# Patient Record
Sex: MALE | Race: WHITE | NOT HISPANIC OR LATINO | Employment: OTHER | URBAN - NONMETROPOLITAN AREA
[De-identification: names, ages, dates, MRNs, and addresses within clinical notes are randomized per-mention and may not be internally consistent; named-entity substitution may affect disease eponyms.]

---

## 2023-03-31 DIAGNOSIS — I10 ESSENTIAL (PRIMARY) HYPERTENSION: ICD-10-CM

## 2023-04-01 RX ORDER — METOPROLOL TARTRATE 50 MG/1
TABLET ORAL
Qty: 180 TABLET | Refills: 3 | Status: SHIPPED | OUTPATIENT
Start: 2023-04-01 | End: 2024-03-18

## 2023-04-24 DIAGNOSIS — F41.8 OTHER SPECIFIED ANXIETY DISORDERS: ICD-10-CM

## 2023-04-25 RX ORDER — QUETIAPINE FUMARATE 25 MG/1
TABLET, FILM COATED ORAL
Qty: 180 TABLET | Refills: 3 | Status: SHIPPED | OUTPATIENT
Start: 2023-04-25 | End: 2024-02-16

## 2023-05-03 ENCOUNTER — OFFICE VISIT (OUTPATIENT)
Dept: PRIMARY CARE | Facility: CLINIC | Age: 68
End: 2023-05-03
Payer: MEDICARE

## 2023-05-03 VITALS
SYSTOLIC BLOOD PRESSURE: 122 MMHG | TEMPERATURE: 98.3 F | WEIGHT: 233 LBS | OXYGEN SATURATION: 98 % | HEART RATE: 85 BPM | HEIGHT: 71 IN | BODY MASS INDEX: 32.62 KG/M2 | DIASTOLIC BLOOD PRESSURE: 66 MMHG

## 2023-05-03 DIAGNOSIS — J44.9 CHRONIC OBSTRUCTIVE PULMONARY DISEASE, UNSPECIFIED COPD TYPE (MULTI): ICD-10-CM

## 2023-05-03 DIAGNOSIS — S76.211A INGUINAL STRAIN, RIGHT, INITIAL ENCOUNTER: Primary | ICD-10-CM

## 2023-05-03 DIAGNOSIS — Z13.6 SCREENING FOR AAA (ABDOMINAL AORTIC ANEURYSM): ICD-10-CM

## 2023-05-03 DIAGNOSIS — F32.1 MODERATE MAJOR DEPRESSION (MULTI): ICD-10-CM

## 2023-05-03 DIAGNOSIS — I48.91 ATRIAL FIBRILLATION, UNSPECIFIED TYPE (MULTI): ICD-10-CM

## 2023-05-03 DIAGNOSIS — E04.2 MULTIPLE THYROID NODULES: ICD-10-CM

## 2023-05-03 PROBLEM — F41.9 ANXIETY: Status: ACTIVE | Noted: 2023-05-03

## 2023-05-03 PROBLEM — K86.1 CHRONIC PANCREATITIS (MULTI): Status: RESOLVED | Noted: 2023-05-03 | Resolved: 2023-05-03

## 2023-05-03 PROBLEM — K29.70 GASTRITIS: Status: RESOLVED | Noted: 2023-05-03 | Resolved: 2023-05-03

## 2023-05-03 PROBLEM — I10 ESSENTIAL HYPERTENSION: Status: ACTIVE | Noted: 2023-05-03

## 2023-05-03 PROBLEM — K29.70 HELICOBACTER PYLORI GASTRITIS: Status: RESOLVED | Noted: 2023-05-03 | Resolved: 2023-05-03

## 2023-05-03 PROBLEM — Z86.69 HISTORY OF CATARACT: Status: RESOLVED | Noted: 2023-05-03 | Resolved: 2023-05-03

## 2023-05-03 PROBLEM — G56.00 CARPAL TUNNEL SYNDROME: Status: RESOLVED | Noted: 2023-05-03 | Resolved: 2023-05-03

## 2023-05-03 PROBLEM — R91.8 MULTIPLE LUNG NODULES: Status: ACTIVE | Noted: 2023-05-03

## 2023-05-03 PROBLEM — C44.92 SQUAMOUS CELL CARCINOMA OF SKIN: Status: ACTIVE | Noted: 2023-05-03

## 2023-05-03 PROBLEM — G47.33 OBSTRUCTIVE SLEEP APNEA: Status: ACTIVE | Noted: 2023-05-03

## 2023-05-03 PROBLEM — F41.8 DEPRESSION WITH ANXIETY: Status: ACTIVE | Noted: 2023-05-03

## 2023-05-03 PROBLEM — H26.9 CATARACT: Status: RESOLVED | Noted: 2023-05-03 | Resolved: 2023-05-03

## 2023-05-03 PROBLEM — B96.81 HELICOBACTER PYLORI GASTRITIS: Status: RESOLVED | Noted: 2023-05-03 | Resolved: 2023-05-03

## 2023-05-03 PROBLEM — F17.200 NICOTINE DEPENDENCE: Status: ACTIVE | Noted: 2023-05-03

## 2023-05-03 PROBLEM — C7A.020: Status: RESOLVED | Noted: 2023-05-03 | Resolved: 2023-05-03

## 2023-05-03 PROBLEM — K21.9 GERD (GASTROESOPHAGEAL REFLUX DISEASE): Status: ACTIVE | Noted: 2023-05-03

## 2023-05-03 PROBLEM — M54.12 CERVICAL RADICULOPATHY: Status: ACTIVE | Noted: 2023-05-03

## 2023-05-03 PROBLEM — E78.5 HYPERLIPIDEMIA: Status: ACTIVE | Noted: 2023-05-03

## 2023-05-03 PROBLEM — M50.10 HERNIATION OF CERVICAL INTERVERTEBRAL DISC WITH RADICULOPATHY: Status: RESOLVED | Noted: 2023-05-03 | Resolved: 2023-05-03

## 2023-05-03 PROBLEM — G25.0 ESSENTIAL TREMOR: Status: ACTIVE | Noted: 2023-05-03

## 2023-05-03 PROCEDURE — 3008F BODY MASS INDEX DOCD: CPT | Performed by: FAMILY MEDICINE

## 2023-05-03 PROCEDURE — 96372 THER/PROPH/DIAG INJ SC/IM: CPT | Performed by: FAMILY MEDICINE

## 2023-05-03 PROCEDURE — 3078F DIAST BP <80 MM HG: CPT | Performed by: FAMILY MEDICINE

## 2023-05-03 PROCEDURE — 99213 OFFICE O/P EST LOW 20 MIN: CPT | Performed by: FAMILY MEDICINE

## 2023-05-03 PROCEDURE — 1159F MED LIST DOCD IN RCRD: CPT | Performed by: FAMILY MEDICINE

## 2023-05-03 PROCEDURE — 1160F RVW MEDS BY RX/DR IN RCRD: CPT | Performed by: FAMILY MEDICINE

## 2023-05-03 PROCEDURE — 3074F SYST BP LT 130 MM HG: CPT | Performed by: FAMILY MEDICINE

## 2023-05-03 RX ORDER — DULOXETIN HYDROCHLORIDE 60 MG/1
CAPSULE, DELAYED RELEASE ORAL
COMMUNITY
End: 2023-05-09 | Stop reason: SDUPTHER

## 2023-05-03 RX ORDER — KETOROLAC TROMETHAMINE 30 MG/ML
30 INJECTION, SOLUTION INTRAMUSCULAR; INTRAVENOUS ONCE
Status: COMPLETED | OUTPATIENT
Start: 2023-05-03 | End: 2023-05-03

## 2023-05-03 RX ORDER — CYANOCOBALAMIN 1000 UG/ML
1000 INJECTION, SOLUTION INTRAMUSCULAR; SUBCUTANEOUS
Status: ON HOLD | COMMUNITY
End: 2023-12-12 | Stop reason: WASHOUT

## 2023-05-03 RX ORDER — CICLOPIROX 80 MG/ML
SOLUTION TOPICAL
Status: ON HOLD | COMMUNITY
End: 2023-12-12

## 2023-05-03 RX ORDER — PANTOPRAZOLE SODIUM 40 MG/1
1 TABLET, DELAYED RELEASE ORAL DAILY
COMMUNITY
Start: 2022-10-30 | End: 2023-10-23

## 2023-05-03 RX ORDER — BUPROPION HYDROCHLORIDE 75 MG/1
75 TABLET ORAL NIGHTLY
COMMUNITY
End: 2023-07-20

## 2023-05-03 RX ORDER — DULOXETIN HYDROCHLORIDE 30 MG/1
CAPSULE, DELAYED RELEASE ORAL
COMMUNITY
End: 2023-05-09 | Stop reason: SDUPTHER

## 2023-05-03 RX ORDER — ALBUTEROL SULFATE 0.83 MG/ML
SOLUTION RESPIRATORY (INHALATION)
COMMUNITY
Start: 2017-01-13

## 2023-05-03 RX ORDER — AMLODIPINE BESYLATE 10 MG/1
10 TABLET ORAL DAILY
COMMUNITY
End: 2023-05-22

## 2023-05-03 RX ORDER — NEEDLES, FILTER 19GX1 1/2"
NEEDLE, DISPOSABLE MISCELLANEOUS
COMMUNITY
Start: 2023-02-01

## 2023-05-03 RX ORDER — SIMVASTATIN 20 MG/1
20 TABLET, FILM COATED ORAL EVERY EVENING
COMMUNITY
End: 2023-05-12

## 2023-05-03 RX ORDER — PREGABALIN 200 MG/1
200 CAPSULE ORAL 2 TIMES DAILY
COMMUNITY
End: 2023-05-09 | Stop reason: SDUPTHER

## 2023-05-03 RX ORDER — ALBUTEROL SULFATE 90 UG/1
2 AEROSOL, METERED RESPIRATORY (INHALATION) EVERY 6 HOURS PRN
Status: ON HOLD | COMMUNITY
Start: 2022-05-08 | End: 2023-12-12

## 2023-05-03 RX ORDER — CHOLECALCIFEROL (VITAMIN D3) 25 MCG
1 TABLET ORAL DAILY
Status: ON HOLD | COMMUNITY
End: 2023-12-12 | Stop reason: WASHOUT

## 2023-05-03 RX ORDER — FLUTICASONE FUROATE, UMECLIDINIUM BROMIDE AND VILANTEROL TRIFENATATE 200; 62.5; 25 UG/1; UG/1; UG/1
1 POWDER RESPIRATORY (INHALATION) DAILY
COMMUNITY
End: 2024-01-02

## 2023-05-03 RX ADMIN — KETOROLAC TROMETHAMINE 30 MG: 30 INJECTION, SOLUTION INTRAMUSCULAR; INTRAVENOUS at 11:19

## 2023-05-03 ASSESSMENT — ENCOUNTER SYMPTOMS
OCCASIONAL FEELINGS OF UNSTEADINESS: 0
DEPRESSION: 0
LOSS OF SENSATION IN FEET: 0

## 2023-05-03 ASSESSMENT — PATIENT HEALTH QUESTIONNAIRE - PHQ9
SUM OF ALL RESPONSES TO PHQ9 QUESTIONS 1 AND 2: 0
1. LITTLE INTEREST OR PLEASURE IN DOING THINGS: NOT AT ALL
2. FEELING DOWN, DEPRESSED OR HOPELESS: NOT AT ALL

## 2023-05-03 NOTE — PATIENT INSTRUCTIONS
Please follow up in 3 months  Please take your medications as prescribed  Please get your ultrasound thyroid and AAA done

## 2023-05-03 NOTE — PROGRESS NOTES
"Subjective   Patient ID: Juanjo Alexis is a 68 y.o. male who presents for Follow-up (Pulled groin/Skin peeling on face for a while).  HPI  Here for follow up  Pulled his groin on the right side when he was getting out of the car a few days ago, improving. No dysuria, hematuria, no hernia present.   Has been having dry skin on his face, admits he does not put any lotion, discussed using Aveeno or eucerin to help  Depression under control with meds  Has history of A-fib, under control with metoprolol  Still has not gotten his ultrasound for evaluation of thyroid nodules    Review of Systems  General: no fever  Eyes: no blurry vision  ENT: no sore throat, no ear pain  Resp: no cough, sob or wheezing  Cardio: no chest pain, no palpitations  Abd: no nausea/vomiting  : no dysuria, no increased urinary frequency      /66   Pulse 85   Temp 36.8 °C (98.3 °F)   Ht 1.803 m (5' 11\")   Wt 106 kg (233 lb)   SpO2 98%   BMI 32.50 kg/m²       Objective   Physical Exam  Gen: NAD, alert  Head: normocephalic/atraumatic  Eyes: conjunctivae normal  Ears: canals clear bilaterally, TM normal   Skin: dry skin present  Nose: external nose normal   Oropharynx: Deferred due to covid precautions, wearing mask  Resp: Clear to auscultation  CVS: Regular rate and rhythm  Abdomen: soft, NT, ND  Ext: no edema, NT of lower extremities       Assessment/Plan   Problem List Items Addressed This Visit       Atrial fibrillation (CMS/HCC)  controlled    Moderate major depression (CMS/HCC)  Depression under control with med     Other Visit Diagnoses       Inguinal strain, right, initial encounter    -  Primary    Relevant Medications    ketorolac (Toradol) injection 30 mg (Completed)    Chronic obstructive pulmonary disease    Under control     BMI 32.0-32.9,adult        Screening for AAA (abdominal aortic aneurysm)        Relevant Orders    Vascular US abdominal aorta anuerysm AAA screening    Multiple thyroid nodules        Relevant Orders "    US thyroid

## 2023-05-09 ENCOUNTER — OFFICE VISIT (OUTPATIENT)
Dept: PRIMARY CARE | Facility: CLINIC | Age: 68
End: 2023-05-09
Payer: MEDICARE

## 2023-05-09 VITALS
OXYGEN SATURATION: 95 % | DIASTOLIC BLOOD PRESSURE: 64 MMHG | TEMPERATURE: 98.6 F | HEIGHT: 71 IN | HEART RATE: 72 BPM | WEIGHT: 231.2 LBS | SYSTOLIC BLOOD PRESSURE: 136 MMHG | BODY MASS INDEX: 32.37 KG/M2

## 2023-05-09 DIAGNOSIS — L02.214 ABSCESS OF GROIN, RIGHT: Primary | ICD-10-CM

## 2023-05-09 DIAGNOSIS — F32.1 MODERATE MAJOR DEPRESSION (MULTI): ICD-10-CM

## 2023-05-09 DIAGNOSIS — M54.12 CERVICAL RADICULOPATHY: Primary | ICD-10-CM

## 2023-05-09 PROCEDURE — 3078F DIAST BP <80 MM HG: CPT | Performed by: PHYSICIAN ASSISTANT

## 2023-05-09 PROCEDURE — 1160F RVW MEDS BY RX/DR IN RCRD: CPT | Performed by: PHYSICIAN ASSISTANT

## 2023-05-09 PROCEDURE — 1159F MED LIST DOCD IN RCRD: CPT | Performed by: PHYSICIAN ASSISTANT

## 2023-05-09 PROCEDURE — 3008F BODY MASS INDEX DOCD: CPT | Performed by: PHYSICIAN ASSISTANT

## 2023-05-09 PROCEDURE — 3075F SYST BP GE 130 - 139MM HG: CPT | Performed by: PHYSICIAN ASSISTANT

## 2023-05-09 PROCEDURE — 99214 OFFICE O/P EST MOD 30 MIN: CPT | Performed by: PHYSICIAN ASSISTANT

## 2023-05-09 RX ORDER — PREGABALIN 200 MG/1
200 CAPSULE ORAL 2 TIMES DAILY
Qty: 60 CAPSULE | Refills: 3 | Status: SHIPPED | OUTPATIENT
Start: 2023-05-15 | End: 2023-11-30

## 2023-05-09 RX ORDER — DOXYCYCLINE 100 MG/1
100 CAPSULE ORAL 2 TIMES DAILY
Qty: 20 CAPSULE | Refills: 0 | Status: SHIPPED | OUTPATIENT
Start: 2023-05-09 | End: 2023-05-19

## 2023-05-09 RX ORDER — CHLORHEXIDINE GLUCONATE 40 MG/ML
SOLUTION TOPICAL DAILY PRN
Qty: 236 ML | Refills: 2 | Status: SHIPPED | OUTPATIENT
Start: 2023-05-09 | End: 2023-05-16

## 2023-05-09 RX ORDER — MUPIROCIN CALCIUM 20 MG/G
CREAM TOPICAL 3 TIMES DAILY
Qty: 30 G | Refills: 0 | Status: SHIPPED | OUTPATIENT
Start: 2023-05-09 | End: 2023-05-19

## 2023-05-09 RX ORDER — DULOXETIN HYDROCHLORIDE 30 MG/1
CAPSULE, DELAYED RELEASE ORAL
Qty: 90 CAPSULE | Refills: 0 | Status: SHIPPED | OUTPATIENT
Start: 2023-05-09 | End: 2023-10-19

## 2023-05-09 RX ORDER — DULOXETIN HYDROCHLORIDE 60 MG/1
CAPSULE, DELAYED RELEASE ORAL
Qty: 90 CAPSULE | Refills: 0 | Status: SHIPPED | OUTPATIENT
Start: 2023-05-09 | End: 2023-07-20

## 2023-05-09 ASSESSMENT — PATIENT HEALTH QUESTIONNAIRE - PHQ9
1. LITTLE INTEREST OR PLEASURE IN DOING THINGS: NOT AT ALL
SUM OF ALL RESPONSES TO PHQ9 QUESTIONS 1 AND 2: 0
2. FEELING DOWN, DEPRESSED OR HOPELESS: NOT AT ALL

## 2023-05-09 NOTE — PROGRESS NOTES
"Subjective   Patient ID: Juanjo Alexis is a 68 y.o. male who presents for Cyst (In groin area. Pain. Infected. Noticed it Monday morning. Pulled a groin muscle awhile back. Had a one awhile back on his buttocks. ).    HPI Juanjo Alexis is a 68 y.o. year old male patient with presenting to clinic with concern for   Chief Complaint   Patient presents with    Cyst     In groin area. Pain. Infected. Noticed it Monday morning. Pulled a groin muscle awhile back. Had a one awhile back on his buttocks.        1 week lump R groin- had an abscess starting- wife squeezed puss out of the lesion. Increasing.  Has been wheezy at home. Still using trelegy.    Patient Active Problem List   Diagnosis    Chronic obstructive pulmonary disease (CMS/HCC)    Anxiety    Atrial fibrillation (CMS/HCC)    Cervical radiculopathy    Squamous cell carcinoma of skin    Depression with anxiety    GERD (gastroesophageal reflux disease)    Essential tremor    Essential hypertension    Hyperlipidemia    Moderate major depression (CMS/HCC)    Multiple lung nodules    Nicotine dependence    Nontoxic multinodular goiter    Obstructive sleep apnea       Current Outpatient Medications:     albuterol 2.5 mg /3 mL (0.083 %) nebulizer solution, USE 1 UNIT DOSE EVERY 4-6 HOURS AS NEEDED FOR WHEEZING ., Disp: , Rfl:     albuterol 90 mcg/actuation inhaler, Inhale 2 puffs every 6 hours if needed for shortness of breath., Disp: , Rfl:     amLODIPine (Norvasc) 10 mg tablet, Take 1 tablet (10 mg) by mouth once daily., Disp: , Rfl:     BD Integra Syringe 3 mL 25 gauge x 5/8\" syringe, INJECT VITAMIN B12 ONCE MONTHLY, Disp: , Rfl:     buPROPion (Wellbutrin) 75 mg tablet, Take 1 tablet (75 mg) by mouth once daily at bedtime., Disp: , Rfl:     chlorhexidine (Hibiclens) 4 % external liquid, Apply topically once daily as needed for wound care for up to 7 days., Disp: 236 mL, Rfl: 2    cholecalciferol (Vitamin D-3) 25 MCG (1000 UT) tablet, Take 1 tablet (25 mcg) by " mouth once daily., Disp: , Rfl:     ciclopirox (Penlac) 8 % solution, APPLY TO AFFECTED NAIL BED AND SKIN DAILY, REMOVE WITH ALCOHOL EVERY 7 DAYS CONTINUE AS DIRECTED, Disp: , Rfl:     cyanocobalamin (Vitamin B-12) 1,000 mcg/mL injection, Inject 1 mL (1,000 mcg) into the shoulder, thigh, or buttocks every 30 (thirty) days., Disp: , Rfl:     diclofenac sodium 1 % kit, APPLY SPARINGLY TO THE AFFECTED AREA TWO TO THREE TIMES A DAY AS NEEDED FOR PAIN, Disp: , Rfl:     doxycycline (Vibramycin) 100 mg capsule, Take 1 capsule (100 mg) by mouth 2 times a day for 10 days. Take with at least 8 ounces (large glass) of water, do not lie down for 30 minutes after. Take with food., Disp: 20 capsule, Rfl: 0    DULoxetine (Cymbalta) 30 mg DR capsule, TAKE 1 CAPSULE BY MOUTH ONCE DAILY, TAKE WITH THE 60MG TO EQUAL 90MG, Disp: 90 capsule, Rfl: 0    DULoxetine (Cymbalta) 60 mg DR capsule, TAKE 1 CAPSULE DAILY ALONG WITH THE 30MG, TO EQUAL 90MG, Disp: 90 capsule, Rfl: 0    metoprolol tartrate (Lopressor) 50 mg tablet, TAKE 1 TABLET BY MOUTH TWICE A DAY, Disp: 180 tablet, Rfl: 3    mupirocin (Bactroban) 2 % cream, Apply topically 3 times a day for 10 days. apply to affected area, Disp: 30 g, Rfl: 0    pantoprazole (ProtoNix) 40 mg EC tablet, Take 1 tablet (40 mg) by mouth once daily., Disp: , Rfl:     [START ON 5/15/2023] pregabalin (Lyrica) 200 mg capsule, Take 1 capsule (200 mg) by mouth 2 times a day. Do not start before May 15, 2023., Disp: 60 capsule, Rfl: 3    QUEtiapine (SEROquel) 25 mg tablet, TAKE 1 TABLET BY MOUTH TWICE A DAY, Disp: 180 tablet, Rfl: 3    simvastatin (Zocor) 20 mg tablet, Take 1 tablet (20 mg) by mouth once daily in the evening., Disp: , Rfl:     Trelegy Ellipta 200-62.5-25 mcg blister with device, Inhale 1 puff once daily., Disp: , Rfl:          Review of Systems  Review of Systems:   Constitutional: Denies fever  HEENT: Denies ST, earache  CVS: Denies Chest pain  Pulmonary: Denies wheezing, SOB  GI: Denies  "N/V  : Denies dysuria  Musculoskeletal:  Denies myalgia  Neuro: Denies focal weakness or numbness.  Skin: Denies Rashes.  *Review of Systems is negative unless otherwise mentioned in HPI or ROS above.    Objective   /64   Pulse 72   Temp 37 °C (98.6 °F)   Ht 1.803 m (5' 11\")   Wt 105 kg (231 lb 3.2 oz)   SpO2 95%   BMI 32.25 kg/m²     Physical Exam  Physical exam:  /64   Pulse 72   Temp 37 °C (98.6 °F)   Ht 1.803 m (5' 11\")   Wt 105 kg (231 lb 3.2 oz)   SpO2 95%   BMI 32.25 kg/m²  reviewed   Body mass index is 32.25 kg/m².     Constitutional: NAD.  Resting comfortably.  Head: Atraumatic, normocephalic.  EENT: deferred d/t masking  Cardiac: Regular rate & rhythm.   Pulmonary: Lungs clear bilat  GI: Soft, Nontender, nondistended.   Musculoskeletal: No peripheral edema.   Skin: No evidence of trauma. Erythema surrounding excoriated lesion R inguinal fold anteriorly. Induration locally approx 2cm round and 1cm deep into the soft tissue without fluctuance. Scant amount of yellow purulent material able to be expressed. No extension of induration or erythema to scrotum. No gangrene or subcutaneous emphysema.   Psych: Intact judgement and insight.      Assessment/Plan   Problem List Items Addressed This Visit       Moderate major depression (CMS/HCC)    Relevant Medications    DULoxetine (Cymbalta) 30 mg DR capsule    DULoxetine (Cymbalta) 60 mg DR capsule     Other Visit Diagnoses       Abscess of groin, right    -  Primary    Relevant Medications    doxycycline (Vibramycin) 100 mg capsule    mupirocin (Bactroban) 2 % cream    chlorhexidine (Hibiclens) 4 % external liquid               "

## 2023-05-12 DIAGNOSIS — E78.5 HYPERLIPIDEMIA, UNSPECIFIED: ICD-10-CM

## 2023-05-12 RX ORDER — SIMVASTATIN 20 MG/1
TABLET, FILM COATED ORAL
Qty: 90 TABLET | Refills: 3 | Status: SHIPPED | OUTPATIENT
Start: 2023-05-12 | End: 2024-02-16

## 2023-05-21 DIAGNOSIS — M54.12 RADICULOPATHY, CERVICAL REGION: ICD-10-CM

## 2023-05-21 DIAGNOSIS — S73.119A: ICD-10-CM

## 2023-05-21 DIAGNOSIS — I10 ESSENTIAL (PRIMARY) HYPERTENSION: ICD-10-CM

## 2023-05-22 RX ORDER — CYCLOBENZAPRINE HCL 10 MG
TABLET ORAL
Qty: 30 TABLET | Refills: 11 | Status: SHIPPED | OUTPATIENT
Start: 2023-05-22 | End: 2023-10-05 | Stop reason: SDUPTHER

## 2023-05-22 RX ORDER — DICLOFENAC SODIUM 10 MG/G
GEL TOPICAL
Qty: 100 G | Refills: 5 | Status: SHIPPED | OUTPATIENT
Start: 2023-05-22 | End: 2023-11-01 | Stop reason: WASHOUT

## 2023-05-22 RX ORDER — AMLODIPINE BESYLATE 10 MG/1
TABLET ORAL
Qty: 90 TABLET | Refills: 3 | Status: SHIPPED | OUTPATIENT
Start: 2023-05-22 | End: 2024-04-17

## 2023-05-30 ENCOUNTER — PROCEDURE VISIT (OUTPATIENT)
Dept: PRIMARY CARE | Facility: CLINIC | Age: 68
End: 2023-05-30
Payer: MEDICARE

## 2023-05-30 VITALS
HEART RATE: 69 BPM | OXYGEN SATURATION: 96 % | WEIGHT: 229 LBS | TEMPERATURE: 98 F | BODY MASS INDEX: 32.06 KG/M2 | SYSTOLIC BLOOD PRESSURE: 126 MMHG | HEIGHT: 71 IN | DIASTOLIC BLOOD PRESSURE: 60 MMHG

## 2023-05-30 DIAGNOSIS — L02.91 ABSCESS: Primary | ICD-10-CM

## 2023-05-30 DIAGNOSIS — B35.6 JOCK ITCH: ICD-10-CM

## 2023-05-30 PROCEDURE — 99213 OFFICE O/P EST LOW 20 MIN: CPT | Performed by: FAMILY MEDICINE

## 2023-05-30 RX ORDER — SULFAMETHOXAZOLE AND TRIMETHOPRIM 800; 160 MG/1; MG/1
1 TABLET ORAL 2 TIMES DAILY
Qty: 20 TABLET | Refills: 0 | Status: SHIPPED | OUTPATIENT
Start: 2023-05-30 | End: 2023-06-09

## 2023-05-30 ASSESSMENT — ENCOUNTER SYMPTOMS
LOSS OF SENSATION IN FEET: 0
DEPRESSION: 0
OCCASIONAL FEELINGS OF UNSTEADINESS: 0

## 2023-05-30 NOTE — PATIENT INSTRUCTIONS
Please follow up in 1 week  Please take your medications as prescribed  please follow up with general surgeon  Please use warm compress and mupirocin cream to the abscess  Please use ketoconazole cream for the jock itch

## 2023-05-30 NOTE — PROGRESS NOTES
"Subjective   Patient ID: Juanjo Alexis is a 68 y.o. male who presents for ctyst on butt (Wednesday uit iftlhd9a has been the 5th one in about 2 months).  HPI  Here with an abscess on his right butt cheek for the past few days, is draining pus. Been putting bactroban on it from when he had an abscess the last time. Painful. No fever  Has rash in inguinal area, does have ketoconazole at home, will use.     Review of Systems  General: no fever  Eyes: no blurry vision  ENT: no sore throat, no ear pain  Resp: no cough, sob or wheezing  Cardio: no chest pain, no palpitations  Abd: no nausea/vomiting  : no dysuria, no increased urinary frequency        /60   Pulse 69   Temp 36.7 °C (98 °F)   Ht 1.803 m (5' 11\")   Wt 104 kg (229 lb)   SpO2 96%   BMI 31.94 kg/m²       Objective   Physical Exam  Gen: NAD, alert  Head: normocephalic/atraumatic  Eyes: conjunctivae normal  Ears: canals clear bilaterally, TM normal   Resp: Clear to auscultation  CVS: Regular rate and rhythm  Abdomen: soft, NT, ND  Ext: no edema, NT of lower extremities  Skin: non-fluctuant abscess with surrounding erythema on right butt cheek, with opening, draining yellowish discharge            Assessment/Plan   Problem List Items Addressed This Visit    None  Visit Diagnoses       Abscess    -  Primary    Relevant Medications    sulfamethoxazole-trimethoprim (Bactrim DS) 800-160 mg tablet    Other Relevant Orders    Referral to General Surgery    Jock itch      Use ketoconazole cream               "

## 2023-06-08 LAB
GRAM STAIN: ABNORMAL
TISSUE/WOUND CULTURE/SMEAR: ABNORMAL
TISSUE/WOUND CULTURE/SMEAR: ABNORMAL

## 2023-07-10 ENCOUNTER — OFFICE VISIT (OUTPATIENT)
Dept: PRIMARY CARE | Facility: CLINIC | Age: 68
End: 2023-07-10
Payer: MEDICARE

## 2023-07-10 VITALS
HEIGHT: 71 IN | TEMPERATURE: 98 F | SYSTOLIC BLOOD PRESSURE: 122 MMHG | BODY MASS INDEX: 32.38 KG/M2 | WEIGHT: 231.3 LBS | OXYGEN SATURATION: 95 % | HEART RATE: 59 BPM | DIASTOLIC BLOOD PRESSURE: 68 MMHG

## 2023-07-10 DIAGNOSIS — G89.29 CHRONIC PAIN OF LEFT KNEE: Primary | ICD-10-CM

## 2023-07-10 DIAGNOSIS — M25.562 CHRONIC PAIN OF LEFT KNEE: Primary | ICD-10-CM

## 2023-07-10 PROCEDURE — 3008F BODY MASS INDEX DOCD: CPT | Performed by: FAMILY MEDICINE

## 2023-07-10 PROCEDURE — 1160F RVW MEDS BY RX/DR IN RCRD: CPT | Performed by: FAMILY MEDICINE

## 2023-07-10 PROCEDURE — 3078F DIAST BP <80 MM HG: CPT | Performed by: FAMILY MEDICINE

## 2023-07-10 PROCEDURE — 1159F MED LIST DOCD IN RCRD: CPT | Performed by: FAMILY MEDICINE

## 2023-07-10 PROCEDURE — 99212 OFFICE O/P EST SF 10 MIN: CPT | Performed by: FAMILY MEDICINE

## 2023-07-10 PROCEDURE — 3074F SYST BP LT 130 MM HG: CPT | Performed by: FAMILY MEDICINE

## 2023-07-10 PROCEDURE — 4004F PT TOBACCO SCREEN RCVD TLK: CPT | Performed by: FAMILY MEDICINE

## 2023-07-10 RX ORDER — KETOROLAC TROMETHAMINE 10 MG/1
10 TABLET, FILM COATED ORAL EVERY 6 HOURS PRN
Qty: 20 TABLET | Refills: 0 | Status: SHIPPED | OUTPATIENT
Start: 2023-07-10 | End: 2023-07-15

## 2023-07-10 ASSESSMENT — PATIENT HEALTH QUESTIONNAIRE - PHQ9
2. FEELING DOWN, DEPRESSED OR HOPELESS: NOT AT ALL
1. LITTLE INTEREST OR PLEASURE IN DOING THINGS: NOT AT ALL
SUM OF ALL RESPONSES TO PHQ9 QUESTIONS 1 AND 2: 0

## 2023-07-10 ASSESSMENT — ENCOUNTER SYMPTOMS
LOSS OF SENSATION IN FEET: 0
OCCASIONAL FEELINGS OF UNSTEADINESS: 0
DEPRESSION: 0

## 2023-07-10 NOTE — PROGRESS NOTES
"Subjective   Patient ID: Juanjo Alexis is a 68 y.o. male who presents for Knee Pain (Knee blown out couple weeks usually comes and goes this time it is not going away, ).    HPI  Here for urgent visit  Has been having left knee pain for years, usually goes away, but for the past few weeks has been constant. Denies any trauma    Review of Systems  General: no fever  Eyes: no blurry vision  ENT: no sore throat, no ear pain  Resp: no cough, sob or wheezing  Cardio: no chest pain, no palpitations  Abd: no nausea/vomiting  : no dysuria, no increased urinary frequency      /68   Pulse 59   Temp 36.7 °C (98 °F)   Ht 1.803 m (5' 11\")   Wt 105 kg (231 lb 4.8 oz)   SpO2 95%   BMI 32.26 kg/m²       Objective   Physical Exam  Gen: NAD, alert  Head: normocephalic/atraumatic  Eyes: conjunctivae normal  Ears: canals clear bilaterally, TM normal   Nose: external nose normal   Resp: Clear to auscultation  CVS: Regular rate and rhythm  Abdomen: soft, NT, ND  Ext: mild soft tissue swelling of left knee, limited ROM    Assessment/Plan   Problem List Items Addressed This Visit    None  Visit Diagnoses       Chronic pain of left knee    -  Primary    Relevant Orders    XR knee left 3 views (Completed)    Referral to Orthopaedic Surgery               "

## 2023-07-19 DIAGNOSIS — F32.1 MODERATE MAJOR DEPRESSION (MULTI): ICD-10-CM

## 2023-07-19 DIAGNOSIS — F32.A DEPRESSION, UNSPECIFIED: ICD-10-CM

## 2023-07-20 RX ORDER — DULOXETIN HYDROCHLORIDE 60 MG/1
CAPSULE, DELAYED RELEASE ORAL
Qty: 90 CAPSULE | Refills: 1 | Status: SHIPPED | OUTPATIENT
Start: 2023-07-20 | End: 2023-10-19

## 2023-07-20 RX ORDER — BUPROPION HYDROCHLORIDE 75 MG/1
TABLET ORAL
Qty: 90 TABLET | Refills: 3 | Status: SHIPPED | OUTPATIENT
Start: 2023-07-20 | End: 2023-11-01 | Stop reason: WASHOUT

## 2023-07-28 DIAGNOSIS — M11.269 PSEUDOGOUT OF KNEE, UNSPECIFIED LATERALITY: Primary | ICD-10-CM

## 2023-07-28 RX ORDER — ALLOPURINOL 100 MG/1
100 TABLET ORAL DAILY
Qty: 30 TABLET | Refills: 11 | Status: SHIPPED | OUTPATIENT
Start: 2023-07-28 | End: 2023-08-22

## 2023-08-15 ENCOUNTER — PROCEDURE VISIT (OUTPATIENT)
Dept: PRIMARY CARE | Facility: CLINIC | Age: 68
End: 2023-08-15
Payer: MEDICARE

## 2023-08-15 VITALS
TEMPERATURE: 96 F | DIASTOLIC BLOOD PRESSURE: 64 MMHG | BODY MASS INDEX: 31.89 KG/M2 | SYSTOLIC BLOOD PRESSURE: 120 MMHG | WEIGHT: 227.8 LBS | OXYGEN SATURATION: 95 % | HEART RATE: 73 BPM | HEIGHT: 71 IN

## 2023-08-15 DIAGNOSIS — G89.29 CHRONIC PAIN OF LEFT KNEE: Primary | ICD-10-CM

## 2023-08-15 DIAGNOSIS — M25.562 CHRONIC PAIN OF LEFT KNEE: Primary | ICD-10-CM

## 2023-08-15 PROCEDURE — 99213 OFFICE O/P EST LOW 20 MIN: CPT | Performed by: FAMILY MEDICINE

## 2023-08-15 RX ORDER — METHYLPREDNISOLONE 4 MG/1
TABLET ORAL
Qty: 21 TABLET | Refills: 0 | Status: SHIPPED | OUTPATIENT
Start: 2023-08-15 | End: 2023-08-22

## 2023-08-15 ASSESSMENT — PATIENT HEALTH QUESTIONNAIRE - PHQ9
1. LITTLE INTEREST OR PLEASURE IN DOING THINGS: NOT AT ALL
2. FEELING DOWN, DEPRESSED OR HOPELESS: NOT AT ALL
SUM OF ALL RESPONSES TO PHQ9 QUESTIONS 1 AND 2: 0

## 2023-08-15 ASSESSMENT — ENCOUNTER SYMPTOMS
DEPRESSION: 0
LOSS OF SENSATION IN FEET: 0
OCCASIONAL FEELINGS OF UNSTEADINESS: 0

## 2023-08-15 NOTE — PROGRESS NOTES
"Subjective   Patient ID: Juanjo Aelxis is a 68 y.o. male who presents for Follow-up and Knee Pain (Left knee pain since last Thursday).  HPI  Here for urgent visit  Has been having left knee pain x 5 days, denies any trauma. Had xray done which showed cppd arthropathy, on allopurinol. Has not yet made appt with orthopedic or PT    Review of Systems  General: no fever  Eyes: no blurry vision  ENT: no sore throat, no ear pain  Resp: no cough, sob or wheezing  Cardio: no chest pain, no palpitations  Abd: no nausea/vomiting  : no dysuria, no increased urinary frequency      /64   Pulse 73   Temp 35.6 °C (96 °F)   Ht 1.803 m (5' 11\")   Wt 103 kg (227 lb 12.8 oz)   SpO2 95%   BMI 31.77 kg/m²       Objective   Physical Exam  Gen: NAD, alert  Head: normocephalic/atraumatic  Eyes: conjunctivae normal  Ears: canals clear bilaterally, TM normal   Nose: external nose normal   Oropharynx: clear   Resp: Clear to auscultation  CVS: Regular rate and rhythm  Abdomen: soft, NT, ND  Ext: no warmth, no swelling, no erythema of left knee          Assessment/Plan   Problem List Items Addressed This Visit    None  Visit Diagnoses       Chronic pain of left knee    -  Primary    Relevant Medications    methylPREDNISolone (Medrol Dospak) 4 mg tablets  Follow up with orthopedic               "

## 2023-08-22 DIAGNOSIS — M11.269 PSEUDOGOUT OF KNEE, UNSPECIFIED LATERALITY: ICD-10-CM

## 2023-08-22 RX ORDER — ALLOPURINOL 100 MG/1
100 TABLET ORAL DAILY
Qty: 30 TABLET | Refills: 11 | Status: SHIPPED | OUTPATIENT
Start: 2023-08-22 | End: 2023-11-01 | Stop reason: WASHOUT

## 2023-10-05 ENCOUNTER — OFFICE VISIT (OUTPATIENT)
Dept: PRIMARY CARE | Facility: CLINIC | Age: 68
End: 2023-10-05
Payer: MEDICARE

## 2023-10-05 VITALS
DIASTOLIC BLOOD PRESSURE: 64 MMHG | WEIGHT: 231 LBS | BODY MASS INDEX: 32.34 KG/M2 | OXYGEN SATURATION: 96 % | SYSTOLIC BLOOD PRESSURE: 118 MMHG | HEART RATE: 76 BPM | HEIGHT: 71 IN | TEMPERATURE: 97 F

## 2023-10-05 DIAGNOSIS — J44.1 COPD EXACERBATION (MULTI): ICD-10-CM

## 2023-10-05 DIAGNOSIS — G89.29 CHRONIC RIGHT SHOULDER PAIN: ICD-10-CM

## 2023-10-05 DIAGNOSIS — Q10.5 CONGENITAL BLOCKED TEAR DUCT: ICD-10-CM

## 2023-10-05 DIAGNOSIS — M25.511 CHRONIC RIGHT SHOULDER PAIN: ICD-10-CM

## 2023-10-05 DIAGNOSIS — B35.6 TINEA CRURIS: Primary | ICD-10-CM

## 2023-10-05 PROCEDURE — 96372 THER/PROPH/DIAG INJ SC/IM: CPT | Performed by: FAMILY MEDICINE

## 2023-10-05 PROCEDURE — 1160F RVW MEDS BY RX/DR IN RCRD: CPT | Performed by: FAMILY MEDICINE

## 2023-10-05 PROCEDURE — 3078F DIAST BP <80 MM HG: CPT | Performed by: FAMILY MEDICINE

## 2023-10-05 PROCEDURE — 4004F PT TOBACCO SCREEN RCVD TLK: CPT | Performed by: FAMILY MEDICINE

## 2023-10-05 PROCEDURE — 3008F BODY MASS INDEX DOCD: CPT | Performed by: FAMILY MEDICINE

## 2023-10-05 PROCEDURE — 3074F SYST BP LT 130 MM HG: CPT | Performed by: FAMILY MEDICINE

## 2023-10-05 PROCEDURE — 99214 OFFICE O/P EST MOD 30 MIN: CPT | Performed by: FAMILY MEDICINE

## 2023-10-05 PROCEDURE — 1159F MED LIST DOCD IN RCRD: CPT | Performed by: FAMILY MEDICINE

## 2023-10-05 RX ORDER — PREDNISONE 20 MG/1
TABLET ORAL
Qty: 18 TABLET | Refills: 0 | Status: SHIPPED | OUTPATIENT
Start: 2023-10-05 | End: 2023-11-01 | Stop reason: WASHOUT

## 2023-10-05 RX ORDER — KETOROLAC TROMETHAMINE 30 MG/ML
30 INJECTION, SOLUTION INTRAMUSCULAR; INTRAVENOUS ONCE
Status: COMPLETED | OUTPATIENT
Start: 2023-10-05 | End: 2023-10-05

## 2023-10-05 RX ORDER — AZITHROMYCIN 250 MG/1
TABLET, FILM COATED ORAL
Qty: 6 TABLET | Refills: 0 | Status: SHIPPED | OUTPATIENT
Start: 2023-10-05 | End: 2023-11-01 | Stop reason: WASHOUT

## 2023-10-05 RX ORDER — KETOCONAZOLE 20 MG/G
CREAM TOPICAL DAILY
Qty: 60 G | Refills: 0 | Status: SHIPPED | OUTPATIENT
Start: 2023-10-05 | End: 2023-10-19

## 2023-10-05 RX ORDER — MELOXICAM 15 MG/1
15 TABLET ORAL DAILY PRN
Qty: 30 TABLET | Refills: 11 | Status: SHIPPED | OUTPATIENT
Start: 2023-10-05 | End: 2023-11-01 | Stop reason: WASHOUT

## 2023-10-05 RX ORDER — CYCLOBENZAPRINE HCL 10 MG
TABLET ORAL
Qty: 30 TABLET | Refills: 11 | Status: SHIPPED | OUTPATIENT
Start: 2023-10-05 | End: 2023-11-01 | Stop reason: WASHOUT

## 2023-10-05 RX ORDER — CYCLOBENZAPRINE HCL 10 MG
TABLET ORAL
Qty: 30 TABLET | Refills: 11 | Status: SHIPPED | OUTPATIENT
Start: 2023-10-05 | End: 2023-10-05 | Stop reason: SDUPTHER

## 2023-10-05 RX ADMIN — KETOROLAC TROMETHAMINE 30 MG: 30 INJECTION, SOLUTION INTRAMUSCULAR; INTRAVENOUS at 12:32

## 2023-10-05 ASSESSMENT — ENCOUNTER SYMPTOMS
DEPRESSION: 0
LOSS OF SENSATION IN FEET: 0
OCCASIONAL FEELINGS OF UNSTEADINESS: 0

## 2023-10-05 NOTE — PROGRESS NOTES
"Subjective   Patient ID: Juanjo Alexis is a 68 y.o. male who presents for Follow-up (Wound for a month, penis and groin area red/Can't walk seems to be getting worse/Rt shoulder pain/) and Eye Burn (Sticky burns green).    HPI  Here for urgent visit  Has been having right shoulder pain off and on for years, but for the past few weeks has been getting worse, no fall  Not taking anything for it  Has been having yellowish-greenish discharge from eyes for years, was told he had blocked tear duct to follow up with eye doctor  Has been having wheezing and cough x few weeks, been using his albuterol more often  Has been having itching and redness in his groin area and penile region      Review of Systems  General: no fever  Eyes: no blurry vision  ENT: no sore throat, no ear pain  Resp: no cough, sob or wheezing  Cardio: no chest pain, no palpitations  Abd: no nausea/vomiting  : no dysuria, no increased urinary frequency      /64   Pulse 76   Temp 36.1 °C (97 °F)   Ht 1.803 m (5' 11\")   Wt 105 kg (231 lb)   SpO2 96%   BMI 32.22 kg/m²       Objective   Physical Exam  Gen: NAD, alert  Head: normocephalic/atraumatic  Eyes: conjunctivae normal  Ears: canals clear bilaterally, TM normal   Nose: external nose normal   Oropharynx: clear   Resp: Clear to auscultation  CVS: Regular rate and rhythm  Abdomen: soft, NT, ND  Ext: no edema, NT of lower extremities, limited ROM of right shoulder   : tinea cruris present  Neuro: gait normal     Assessment/Plan   Problem List Items Addressed This Visit    None  Visit Diagnoses       Tinea cruris    -  Primary    Relevant Medications    ketoconazole (NIZOral) 2 % cream    Chronic right shoulder pain        Relevant Medications    ketorolac (Toradol) injection 30 mg (Completed)    meloxicam (Mobic) 15 mg tablet    cyclobenzaprine (Flexeril) 10 mg tablet    Other Relevant Orders    Referral to Physical Therapy    XR shoulder right 2+ views    COPD exacerbation (CMS/HCC)     "    Relevant Medications    predniSONE (Deltasone) 20 mg tablet    azithromycin (Zithromax) 250 mg tablet    Congenital blocked tear duct        Relevant Orders    Referral to Ophthalmology

## 2023-10-18 DIAGNOSIS — K21.9 GASTROESOPHAGEAL REFLUX DISEASE WITHOUT ESOPHAGITIS: Primary | ICD-10-CM

## 2023-10-18 DIAGNOSIS — J44.1 COPD EXACERBATION (MULTI): ICD-10-CM

## 2023-10-18 DIAGNOSIS — F32.1 MODERATE MAJOR DEPRESSION (MULTI): ICD-10-CM

## 2023-10-18 DIAGNOSIS — B35.6 TINEA CRURIS: ICD-10-CM

## 2023-10-19 RX ORDER — PREDNISONE 20 MG/1
TABLET ORAL
Qty: 18 TABLET | Refills: 0 | OUTPATIENT
Start: 2023-10-19

## 2023-10-19 RX ORDER — DULOXETIN HYDROCHLORIDE 30 MG/1
CAPSULE, DELAYED RELEASE ORAL
Qty: 90 CAPSULE | Refills: 3 | Status: SHIPPED | OUTPATIENT
Start: 2023-10-19

## 2023-10-19 RX ORDER — KETOCONAZOLE 20 MG/G
CREAM TOPICAL DAILY PRN
Qty: 60 G | Refills: 1 | Status: SHIPPED | OUTPATIENT
Start: 2023-10-19 | End: 2023-11-01 | Stop reason: WASHOUT

## 2023-10-19 RX ORDER — DULOXETIN HYDROCHLORIDE 60 MG/1
CAPSULE, DELAYED RELEASE ORAL
Qty: 90 CAPSULE | Refills: 3 | Status: ON HOLD | OUTPATIENT
Start: 2023-10-19 | End: 2023-12-12 | Stop reason: ALTCHOICE

## 2023-10-23 RX ORDER — PANTOPRAZOLE SODIUM 40 MG/1
40 TABLET, DELAYED RELEASE ORAL DAILY
Qty: 90 TABLET | Refills: 3 | Status: SHIPPED | OUTPATIENT
Start: 2023-10-23 | End: 2023-11-01 | Stop reason: WASHOUT

## 2023-11-01 ENCOUNTER — OFFICE VISIT (OUTPATIENT)
Dept: SURGERY | Facility: CLINIC | Age: 68
End: 2023-11-01
Payer: MEDICARE

## 2023-11-01 VITALS
BODY MASS INDEX: 32.9 KG/M2 | HEART RATE: 67 BPM | DIASTOLIC BLOOD PRESSURE: 70 MMHG | HEIGHT: 71 IN | SYSTOLIC BLOOD PRESSURE: 122 MMHG | WEIGHT: 235 LBS | TEMPERATURE: 98 F

## 2023-11-01 DIAGNOSIS — R21 GROIN RASH: Primary | ICD-10-CM

## 2023-11-01 PROCEDURE — 4004F PT TOBACCO SCREEN RCVD TLK: CPT | Performed by: SURGERY

## 2023-11-01 PROCEDURE — 99212 OFFICE O/P EST SF 10 MIN: CPT | Performed by: SURGERY

## 2023-11-01 PROCEDURE — 1160F RVW MEDS BY RX/DR IN RCRD: CPT | Performed by: SURGERY

## 2023-11-01 PROCEDURE — 3078F DIAST BP <80 MM HG: CPT | Performed by: SURGERY

## 2023-11-01 PROCEDURE — 1159F MED LIST DOCD IN RCRD: CPT | Performed by: SURGERY

## 2023-11-01 PROCEDURE — 3074F SYST BP LT 130 MM HG: CPT | Performed by: SURGERY

## 2023-11-01 PROCEDURE — 3008F BODY MASS INDEX DOCD: CPT | Performed by: SURGERY

## 2023-11-01 NOTE — PATIENT INSTRUCTIONS
You have a persistent groin rash on the right.  I want you to apply Desitin once or twice a day.  This should improve the redness.  I will see you back in 4 weeks.  If there is no improvement then we will consider excising that area.

## 2023-11-01 NOTE — PROGRESS NOTES
Patient ID: Juanjo Alexis is a 68 y.o. male.  Who presents for persistent right groin erythema and redness.  He has a history of hidradenitis.  He has been using topical baby powder which helps to a certain extent but it keeps recurring.      Objective   Physical Exam  Constitutional:       Appearance: Normal appearance.   Skin:     Comments: Right groin skin has excoriation.  There is a very small opening but no drainage.         Problem List Items Addressed This Visit       Groin rash - Primary        Assessment/Plan   Plan trial of topical Desitin.  Apply this once or twice a day.  We will follow-up in 4 weeks.   Azithromycin Pregnancy And Lactation Text: This medication is considered safe during pregnancy and is also secreted in breast milk.

## 2023-11-16 ENCOUNTER — OFFICE VISIT (OUTPATIENT)
Dept: ORTHOPEDIC SURGERY | Facility: CLINIC | Age: 68
End: 2023-11-16
Payer: MEDICARE

## 2023-11-16 DIAGNOSIS — M17.0 PRIMARY OSTEOARTHRITIS OF BOTH KNEES: ICD-10-CM

## 2023-11-16 DIAGNOSIS — G89.29 CHRONIC PAIN OF LEFT KNEE: ICD-10-CM

## 2023-11-16 DIAGNOSIS — M25.562 CHRONIC PAIN OF LEFT KNEE: ICD-10-CM

## 2023-11-16 PROCEDURE — 20610 DRAIN/INJ JOINT/BURSA W/O US: CPT | Performed by: ORTHOPAEDIC SURGERY

## 2023-11-16 PROCEDURE — 99214 OFFICE O/P EST MOD 30 MIN: CPT | Performed by: ORTHOPAEDIC SURGERY

## 2023-11-16 PROCEDURE — 1159F MED LIST DOCD IN RCRD: CPT | Performed by: ORTHOPAEDIC SURGERY

## 2023-11-16 PROCEDURE — 3008F BODY MASS INDEX DOCD: CPT | Performed by: ORTHOPAEDIC SURGERY

## 2023-11-16 PROCEDURE — 1160F RVW MEDS BY RX/DR IN RCRD: CPT | Performed by: ORTHOPAEDIC SURGERY

## 2023-11-16 PROCEDURE — 1126F AMNT PAIN NOTED NONE PRSNT: CPT | Performed by: ORTHOPAEDIC SURGERY

## 2023-11-16 PROCEDURE — 4004F PT TOBACCO SCREEN RCVD TLK: CPT | Performed by: ORTHOPAEDIC SURGERY

## 2023-11-16 NOTE — LETTER
November 16, 2023     Lupe Woody MD  [address] [facility] [address]    Patient: Juanjo Alexis   YOB: 1955   Date of Visit: 11/16/2023       Dear Dr. Lupe Woody MD:    Thank you for referring Juanjo Alexis to me for evaluation. Below are my notes for this consultation.  If you have questions, please do not hesitate to call me. I look forward to following your patient along with you.       Sincerely,     Harry Maher MD      CC: No Recipients  ______________________________________________________________________________________    This is a consultation from Dr. Lupe Woody MD for   Chief Complaint   Patient presents with   • Left Knee - Pain       This is a 68 y.o. male who presents for evaluation of bilateral knee pain.  Patient states he had bilateral knee pain on and off for several years, this is a chronic issue.  He had a exacerbation of the last several weeks with increased sharp stabbing pain over the medial knee on both sides worse with walking.  Left is a little worse than the right.  No numbness no tingling no fevers no chills no shooting pain down the leg.  He states he has an injection in the past but its been a long time.  He does take over-the-counter anti-inflammatories which are helpful.    Physical Exam    There has been no interval change in this patient's past medical, surgical, medications, allergies, family history or social history since the most recent visit to a provider within our department. 14 point review of systems was performed, reviewed, and negative except for pertinent positives documented in the history of present illness.     Constitutional: well developed, well nourished male in no acute distress  Psychiatric: normal mood, appropriate affect  Eyes: sclera anicteric  HENT: normocephalic/atraumatic  CV: regular rate and rhythm   Respiratory: non labored breathing  Integumentary: no rash  Neurological: moves all  extremities    Bilateral knee exam: skin intact no lacerations or abrations.  1+ effusion.  Tender medial joint line. negative log roll negative patellar grind. ROM 0-120. stable to varus and valgus stress at 0 and 30 degrees. negative lachman negative posterior drawer negative prateek. 5/5 ehl/fhl/gs/ta. silt s/s/sp/dp/t. 2+ dp/pt        Xrays were ordered by me, they were reviewed and independently interpreted by me today, they show moderate degenerative changes to medial joint space narrowing chondrocalcinosis    Procedure Note:    Diagnosis: bilateral knee arthritis  Procedure: bilateral knee injection    Verbal consent was obtained from the patient, risks benefits and alternatives were discussed. We discussed the risks and benefits and potential morbidity related to the treatment, and to the prescription medication administered in the injectionA timeout was performed, and the correct patient and site of injection were identified and verified. The lateral side of the knee was sterilized with Betadine, and anesthetized with ethyl chloride spray. The bilateral knee was injected with 1ml kenalog and 4ml lidocaine in the usual fashion. A sterile Band-Aid was placed. The patient tolerated the procedure well with no immediate complications. Standard post injection precautions and instructions were given.        Procedures      Impression/Plan: This is a 68 y.o. male with bilateral knee arthritis.  I had an in depth discussion with the patient regarding treatment options for arthritis and their relative risks and benefits. We reviewed surgical and nonsurgical option for treatment. Treatments include anti inflammatory medications, physical therapy, weight loss, activity modification, use of assistive devices, injection therapies. We discussed current prescriptions and risks and benefits of continuation of prescription medication as apporpriate. We discussed that arthritis is often progressive over time, an in end stage  "arthritis surgical interventions can be considered, including arthroplasty. All questions were answered and the patient voiced their understanding.  I will see him back.    BMI Readings from Last 1 Encounters:   11/01/23 32.78 kg/m²      Lab Results   Component Value Date    CREATININE 1.17 10/29/2022     Tobacco Use: High Risk (11/16/2023)    Patient History    • Smoking Tobacco Use: Every Day    • Smokeless Tobacco Use: Never    • Passive Exposure: Not on file      Computed MELD 3.0 unavailable. Necessary lab results were not found in the last year.  Computed MELD-Na unavailable. Necessary lab results were not found in the last year.       No results found for: \"HGBA1C\"  No results found for: \"STAPHMRSASCR\""

## 2023-11-16 NOTE — PROGRESS NOTES
This is a consultation from Dr. Lupe Woody MD for   Chief Complaint   Patient presents with    Left Knee - Pain       This is a 68 y.o. male who presents for evaluation of bilateral knee pain.  Patient states he had bilateral knee pain on and off for several years, this is a chronic issue.  He had a exacerbation of the last several weeks with increased sharp stabbing pain over the medial knee on both sides worse with walking.  Left is a little worse than the right.  No numbness no tingling no fevers no chills no shooting pain down the leg.  He states he has an injection in the past but its been a long time.  He does take over-the-counter anti-inflammatories which are helpful.    Physical Exam    There has been no interval change in this patient's past medical, surgical, medications, allergies, family history or social history since the most recent visit to a provider within our department. 14 point review of systems was performed, reviewed, and negative except for pertinent positives documented in the history of present illness.     Constitutional: well developed, well nourished male in no acute distress  Psychiatric: normal mood, appropriate affect  Eyes: sclera anicteric  HENT: normocephalic/atraumatic  CV: regular rate and rhythm   Respiratory: non labored breathing  Integumentary: no rash  Neurological: moves all extremities    Bilateral knee exam: skin intact no lacerations or abrations.  1+ effusion.  Tender medial joint line. negative log roll negative patellar grind. ROM 0-120. stable to varus and valgus stress at 0 and 30 degrees. negative lachman negative posterior drawer negative prateek. 5/5 ehl/fhl/gs/ta. silt s/s/sp/dp/t. 2+ dp/pt        Xrays were ordered by me, they were reviewed and independently interpreted by me today, they show moderate degenerative changes to medial joint space narrowing chondrocalcinosis    Procedure Note:    Diagnosis: bilateral knee arthritis  Procedure:  bilateral knee injection    Verbal consent was obtained from the patient, risks benefits and alternatives were discussed. We discussed the risks and benefits and potential morbidity related to the treatment, and to the prescription medication administered in the injectionA timeout was performed, and the correct patient and site of injection were identified and verified. The lateral side of the knee was sterilized with Betadine, and anesthetized with ethyl chloride spray. The bilateral knee was injected with 1ml kenalog and 4ml lidocaine in the usual fashion. A sterile Band-Aid was placed. The patient tolerated the procedure well with no immediate complications. Standard post injection precautions and instructions were given.        Procedures      Impression/Plan: This is a 68 y.o. male with bilateral knee arthritis.  I had an in depth discussion with the patient regarding treatment options for arthritis and their relative risks and benefits. We reviewed surgical and nonsurgical option for treatment. Treatments include anti inflammatory medications, physical therapy, weight loss, activity modification, use of assistive devices, injection therapies. We discussed current prescriptions and risks and benefits of continuation of prescription medication as apporpriate. We discussed that arthritis is often progressive over time, an in end stage arthritis surgical interventions can be considered, including arthroplasty. All questions were answered and the patient voiced their understanding.  I will see him back.    BMI Readings from Last 1 Encounters:   11/01/23 32.78 kg/m²      Lab Results   Component Value Date    CREATININE 1.17 10/29/2022     Tobacco Use: High Risk (11/16/2023)    Patient History     Smoking Tobacco Use: Every Day     Smokeless Tobacco Use: Never     Passive Exposure: Not on file      Computed MELD 3.0 unavailable. Necessary lab results were not found in the last year.  Computed MELD-Na unavailable.  "Necessary lab results were not found in the last year.       No results found for: \"HGBA1C\"  No results found for: \"STAPHMRSASCR\"  "

## 2023-11-17 ENCOUNTER — APPOINTMENT (OUTPATIENT)
Dept: PRIMARY CARE | Facility: CLINIC | Age: 68
End: 2023-11-17
Payer: MEDICARE

## 2023-11-29 ENCOUNTER — OFFICE VISIT (OUTPATIENT)
Dept: SURGERY | Facility: CLINIC | Age: 68
End: 2023-11-29
Payer: MEDICARE

## 2023-11-29 VITALS
TEMPERATURE: 98 F | SYSTOLIC BLOOD PRESSURE: 136 MMHG | HEIGHT: 71 IN | DIASTOLIC BLOOD PRESSURE: 70 MMHG | BODY MASS INDEX: 32.9 KG/M2 | WEIGHT: 235 LBS | HEART RATE: 68 BPM

## 2023-11-29 DIAGNOSIS — L98.9 SKIN LESION OF FACE: ICD-10-CM

## 2023-11-29 DIAGNOSIS — R21 GROIN RASH: Primary | ICD-10-CM

## 2023-11-29 PROCEDURE — 4004F PT TOBACCO SCREEN RCVD TLK: CPT | Performed by: SURGERY

## 2023-11-29 PROCEDURE — 11200 RMVL SKIN TAGS UP TO&INC 15: CPT | Performed by: SURGERY

## 2023-11-29 PROCEDURE — 1159F MED LIST DOCD IN RCRD: CPT | Performed by: SURGERY

## 2023-11-29 PROCEDURE — 3008F BODY MASS INDEX DOCD: CPT | Performed by: SURGERY

## 2023-11-29 PROCEDURE — 3075F SYST BP GE 130 - 139MM HG: CPT | Performed by: SURGERY

## 2023-11-29 PROCEDURE — 3078F DIAST BP <80 MM HG: CPT | Performed by: SURGERY

## 2023-11-29 PROCEDURE — 99212 OFFICE O/P EST SF 10 MIN: CPT | Performed by: SURGERY

## 2023-11-29 PROCEDURE — 1160F RVW MEDS BY RX/DR IN RCRD: CPT | Performed by: SURGERY

## 2023-11-29 RX ORDER — NYSTATIN AND TRIAMCINOLONE ACETONIDE 100000; 1 [USP'U]/G; MG/G
CREAM TOPICAL 2 TIMES DAILY
Qty: 30 G | Refills: 2 | Status: ON HOLD | OUTPATIENT
Start: 2023-11-29 | End: 2023-12-12

## 2023-11-29 NOTE — PROGRESS NOTES
Patient ID: Juanjo Alexis is a 68 y.o. male.  Follow-up for right groin rash.    Subjective   HPI  Patient still has a rash but the opening that was present in the right groin is improved.  There is no drainage.  He has a little bit of rash around the head of his penis.  He also has a small skin lesion on his forehead.    Review of Systems   Skin:  Positive for rash.       Objective   Physical Exam  Constitutional:       Appearance: Normal appearance.   Skin:     Comments: Skin lesion forehead measuring approximate 3 mm in diameter.  Has a persistent right groin rash.  Mild balanitis of the penis.     Patient ID: Juanjo Alexis is a 68 y.o. male.    General    Date/Time: 11/29/2023 11:29 AM    Performed by: Augustin Zadii MD  Authorized by: Augustin Zaidi MD    Consent:     Consent obtained:  Verbal    Risks discussed:  Bleeding  Ledbetter protocol:     Patient identity confirmed:  Verbally with patient  Pre-procedure details:     Skin preparation:  Chlorhexidine with alcohol  Procedure specific details:      The lesion was removed with the scissors.  Dressing was placed.      Problem List Items Addressed This Visit       Groin rash - Primary    Skin lesion of face        Assessment/Plan   Skin lesion removed from the face.  Recommended trial of topical steroid cream.  Will follow-up with his dermatologist.

## 2023-11-29 NOTE — PATIENT INSTRUCTIONS
Anesthesia Pre Eval Note    Anesthesia ROS/Med Hx        Anesthetic Complication History:  Patient does not have a history of anesthetic complications      Pulmonary Review:  Patient does not have a pulmonary history      Neuro/Psych Review:  Patient does not have a neuro/psych history       Cardiovascular Review:    Positive for hypertension  Positive for hyperlipidemia    GI/HEPATIC/RENAL Review:    Positive for hepatitis- type C    Positive for renal disease - chronic renal insufficiency    End/Other Review:  Positive for diabetes  Positive for cancer  Additional Results:     ALLERGIES:   -- Niacin -- HIVES   -- Cat Dander -- Other (See Comments)    --  Rhinitis   -- Seasonal -- Runny Nose    --  SPRING, FALL.       Last Labs        Component                Value               Date/Time                  WBC                      8.4                 12/06/2022 0813            RBC                      3.75 (L)            12/06/2022 0813            HGB                      11.0 (L)            02/06/2023 0753            HCT                      31.6 (L)            02/06/2023 0753            MCV                      81.9                12/06/2022 0813            MCH                      28.5                12/06/2022 0813            MCHC                     34.9                12/06/2022 0813            RDW-CV                   14.0                12/06/2022 0813            Sodium                   138                 02/20/2023 1531            Potassium                4.9                 02/20/2023 1531            Chloride                 104                 02/20/2023 1531            Carbon Dioxide           24                  02/20/2023 1531            Glucose                  240 (H)             02/20/2023 1531            BUN                      33 (H)              02/20/2023 1531            Creatinine               2.09 (H)            02/20/2023 1531            Glomerular Filtrati*     35 (L)               I have sent the cream for your right groin.  Apply this to the groin.  You can also apply this to the skin around the head of the penis.  Make the appoint with a dermatologist that you plan to see.   02/20/2023 1531            Calcium                  9.3                 02/20/2023 1531            PLT                      261                 12/06/2022 0813        Past Medical History:  No date: Allergy      Comment:  cats, seasonal  No date: Cataract associated with type 1 diabetes mellitus (CMD)      Comment:  bilateral  No date: CKD (chronic kidney disease), stage II  No date: Diabetes mellitus (CMD)      Comment:  type 1 since eaerly age of 30s  12/16/2015: DM (diabetes mellitus) type I uncontrolled with renal   manifestation  No date: Hepatitis C  No date: Hives  No date: HTN (hypertension)  No date: Hyperlipoproteinemia  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  1990s: Orif facial fracture      Comment:  jaw  02/2021: Prostate biopsy      Comment:  + cancer right prostate gland.  04/19/2021: Robotic assisted laparoscopic suprapubic prostatectomy       Prior to Admission medications :  Medication lisinopril (ZESTRIL) 2.5 MG tablet, Sig Take 1 tablet by mouth daily. PLEASE NOTE CHANGE IN FORMULATION TO 2.5 MG TABLETS. DOSE REDUCTION 12/14/22., Start Date 3/20/23, End Date 3/14/24, Taking? Yes, Authorizing Provider Montserrat DUNCAN MD    Medication amLODIPine (NORVASC) 10 MG tablet, Sig Take 1 tablet by mouth every evening., Start Date 3/3/23, End Date , Taking? Yes, Authorizing Provider Madeline Wyatt MD    Medication hydrALAZINE (APRESOLINE) 25 MG tablet, Sig Take 1 tablet by mouth in the morning and 1 tablet at noon and 1 tablet in the evening. For high blood pressure. Generic brand Ok. DOSE INCREASE 3/2/2023., Start Date 3/2/23, End Date , Taking? Yes, Authorizing Provider Montserrat DUNCAN MD    Medication ergocalciferol  (DRISDOL) 1.25 mg (50,000 units) capsule, Sig Take 1 capsule weekly X 8 weeks. Once completed resume over the counter Vitamin D3 1,000 units daily., Start Date 2/9/23, End Date , Taking? Yes, Authorizing Provider Madeline Wyatt MD    Medication chlorthalidone (THALITONE) 25 MG tablet, Sig Take 1 tablet by mouth daily., Start Date 2/9/23, End Date , Taking? Yes, Authorizing Provider Madeline Wyatt MD    Medication atorvastatin (LIPITOR) 40 MG tablet, Sig Take 1 tablet by mouth daily., Start Date 12/14/22, End Date 12/9/23, Taking? Yes, Authorizing Provider Montserrat DUNCAN MD    Medication insulin NPH (NovoLIN N) 100 UNIT/ML injectable suspension, Sig Inject 23 Units into the skin in the morning and 23 Units in the evening. Inject before meals. DOSE INCREASE 12/14/22  Patient taking differently: Inject 30 Units into the skin 2 times daily (before meals). DOSE INCREASE 12/14/22, Start Date 12/14/22, End Date , Taking? Yes, Authorizing Provider Montserrat DUNCAN MD    Medication insulin regular, human, (HumuLIN R, NovoLIN R) 100 UNIT/ML injectable solution, Sig Inject 10 Units into the skin in the morning and 10 Units at noon and 10 Units in the evening. Inject before meals. Indications: Insulin-Dependent Diabetes., Start Date 12/14/22, End Date , Taking? Yes, Authorizing Provider Montserrat DUNCAN MD    Medication albuterol (ProAir HFA) 108 (90 Base) MCG/ACT inhaler, Sig INHALE 2 PUFFS INTO LUNGS EVERY 4 HOURS AS NEEDED FOR SHORTNESS OF BREATH OR WHEEZING, Start Date 12/14/22, End Date , Taking? Yes, Authorizing Provider Montserrat DUNCAN MD    Medication CHOLECALCIFEROL PO, Sig Take 1,000 Units by mouth daily., Start Date , End Date , Taking? Yes, Authorizing Provider Outside Provider    Medication nicotine (NICODERM) 14 MG/24HR patch, Sig Place 1 patch onto the skin every 24 hours., Start Date 12/14/22, End Date , Taking? , Authorizing Provider Montserrat DUNCAN MD    Medication nicotine (NICODERM) 7 MG/24HR  patch, Sig Place 1 patch onto the skin every 24 hours., Start Date 12/14/22, End Date , Taking? , Authorizing Provider Montserrat DUNCAN MD    Medication sildenafil (REVATIO) 20 MG tablet, Sig Take 20 mg one hr before sex and if needed, increase by increments of 20 mg at a time, with max of 100 mg/24 hr period, Start Date 8/25/22, End Date , Taking? , Authorizing Provider Montserrat DUNCAN MD    Medication Blood Pressure Monitoring (Blood Pressure Cuff) Misc, Sig Please check blood pressure once daily., Start Date 1/14/21, End Date , Taking? , Authorizing Provider Madeline Wyatt MD    Medication blood glucose (ONE TOUCH ULTRA TEST) test strip, Sig USE TO TEST BLOOD SUGARS THREE TIMES DAILY, Start Date 3/26/20, End Date , Taking? , Authorizing Provider FERNANDO Kent    Medication Lancets Misc. Kit, Sig Test 3 times daily as needed, Start Date 11/12/18, End Date , Taking? , Authorizing Provider FERNANDO Kent    Medication Insulin Pen Needle (B-D U/F PEN NEEDLE 5/16\") 31G X 8 MM Misc, Sig Use 4 times daily with insulin pen, Start Date 7/5/17, End Date , Taking? , Authorizing Provider FERNANDO Kent            Relevant Problems   No relevant active problems       Physical Exam     Airway   Mallampati: II  TM Distance: >3 FB  Neck ROM: Full  TMJ Mobility: Good    Cardiovascular  Cardiovascular exam normal  Cardio Rhythm: Regular  Cardio Rate: Normal    Dental Exam    Patient has:  Poor Dentition    Pulmonary Exam  Pulmonary exam normal      Anesthesia Plan:    ASA Status: 3  Anesthesia Type: MAC      Post-op Pain Management: Per Surgeon      Checklist  Reviewed: Lab Results, EKG, Chest X-Rays, Pregnancy Test Results, Consultations, Outside Records, NPO Status, Medications, Problem list, Past Med History and Nursing Notes  Consent/Risks Discussed Statement:  The proposed anesthetic plan, including its risks and benefits, have been discussed with the Patient along with the risks and benefits of  alternatives. Questions were encouraged and answered and the patient and/or representative understands and agrees to proceed.        I discussed with the patient (and/or patient's legal representative) the risks and benefits of the proposed anesthesia plan, MAC, which may include services performed by other anesthesia providers.    Alternative anesthesia plans, if available, were reviewed with the patient (and/or patient's legal representative). Discussion has been held with the patient (and/or patient's legal representative) regarding risks of anesthesia, which include Nausea, Vomiting, Intra-operative Awareness, Sore Throat, Allergic Reaction, Dental Injury, Emergence Delirium, Hypotension, Nerve Injury and Aspiration and emergent situations that may require change in anesthesia plan.    The patient (and/or patient's legal representative) has indicated understanding, his/her questions have been answered, and he/she wishes to proceed with the planned anesthetic.    Blood Products: Not Anticipated    Comments  Plan Comments: Discussed team model with patient. Discussed risks/Benefits of MAC, pt agrees and wishes to proceed.

## 2023-11-30 DIAGNOSIS — M54.12 CERVICAL RADICULOPATHY: ICD-10-CM

## 2023-11-30 RX ORDER — PREGABALIN 200 MG/1
200 CAPSULE ORAL 2 TIMES DAILY
Qty: 60 CAPSULE | Refills: 5 | Status: SHIPPED | OUTPATIENT
Start: 2023-11-30

## 2023-12-12 ENCOUNTER — APPOINTMENT (OUTPATIENT)
Dept: RADIOLOGY | Facility: HOSPITAL | Age: 68
End: 2023-12-12
Payer: MEDICARE

## 2023-12-12 ENCOUNTER — HOSPITAL ENCOUNTER (OUTPATIENT)
Facility: HOSPITAL | Age: 68
Setting detail: OBSERVATION
Discharge: HOME | End: 2023-12-13
Attending: INTERNAL MEDICINE | Admitting: INTERNAL MEDICINE
Payer: MEDICARE

## 2023-12-12 DIAGNOSIS — R07.9 CHEST PAIN, UNSPECIFIED TYPE: Primary | ICD-10-CM

## 2023-12-12 DIAGNOSIS — J44.9 CHRONIC OBSTRUCTIVE PULMONARY DISEASE, UNSPECIFIED COPD TYPE (MULTI): ICD-10-CM

## 2023-12-12 DIAGNOSIS — R10.32 LEFT LOWER QUADRANT ABDOMINAL PAIN: ICD-10-CM

## 2023-12-12 LAB
ALBUMIN SERPL BCP-MCNC: 4 G/DL (ref 3.4–5)
ALP SERPL-CCNC: 96 U/L (ref 33–136)
ALT SERPL W P-5'-P-CCNC: 10 U/L (ref 10–52)
ANION GAP SERPL CALC-SCNC: 11 MMOL/L (ref 10–20)
AST SERPL W P-5'-P-CCNC: 12 U/L (ref 9–39)
BASOPHILS # BLD AUTO: 0.12 X10*3/UL (ref 0–0.1)
BASOPHILS NFR BLD AUTO: 0.9 %
BILIRUB SERPL-MCNC: 0.4 MG/DL (ref 0–1.2)
BUN SERPL-MCNC: 11 MG/DL (ref 6–23)
CALCIUM SERPL-MCNC: 8.6 MG/DL (ref 8.6–10.3)
CARDIAC TROPONIN I PNL SERPL HS: 4 NG/L (ref 0–20)
CARDIAC TROPONIN I PNL SERPL HS: 5 NG/L (ref 0–20)
CHLORIDE SERPL-SCNC: 105 MMOL/L (ref 98–107)
CO2 SERPL-SCNC: 27 MMOL/L (ref 21–32)
CREAT SERPL-MCNC: 1.22 MG/DL (ref 0.5–1.3)
EOSINOPHIL # BLD AUTO: 0.69 X10*3/UL (ref 0–0.7)
EOSINOPHIL NFR BLD AUTO: 5 %
ERYTHROCYTE [DISTWIDTH] IN BLOOD BY AUTOMATED COUNT: 14.7 % (ref 11.5–14.5)
GFR SERPL CREATININE-BSD FRML MDRD: 65 ML/MIN/1.73M*2
GLUCOSE SERPL-MCNC: 130 MG/DL (ref 74–99)
HCT VFR BLD AUTO: 42.6 % (ref 41–52)
HGB BLD-MCNC: 14 G/DL (ref 13.5–17.5)
IMM GRANULOCYTES # BLD AUTO: 0.08 X10*3/UL (ref 0–0.7)
IMM GRANULOCYTES NFR BLD AUTO: 0.6 % (ref 0–0.9)
LYMPHOCYTES # BLD AUTO: 2.53 X10*3/UL (ref 1.2–4.8)
LYMPHOCYTES NFR BLD AUTO: 18.4 %
MAGNESIUM SERPL-MCNC: 1.88 MG/DL (ref 1.6–2.4)
MCH RBC QN AUTO: 29.9 PG (ref 26–34)
MCHC RBC AUTO-ENTMCNC: 32.9 G/DL (ref 32–36)
MCV RBC AUTO: 91 FL (ref 80–100)
MONOCYTES # BLD AUTO: 1.22 X10*3/UL (ref 0.1–1)
MONOCYTES NFR BLD AUTO: 8.9 %
NEUTROPHILS # BLD AUTO: 9.1 X10*3/UL (ref 1.2–7.7)
NEUTROPHILS NFR BLD AUTO: 66.2 %
NRBC BLD-RTO: 0 /100 WBCS (ref 0–0)
PLATELET # BLD AUTO: 287 X10*3/UL (ref 150–450)
POTASSIUM SERPL-SCNC: 4.1 MMOL/L (ref 3.5–5.3)
PROT SERPL-MCNC: 6.8 G/DL (ref 6.4–8.2)
RBC # BLD AUTO: 4.68 X10*6/UL (ref 4.5–5.9)
SODIUM SERPL-SCNC: 139 MMOL/L (ref 136–145)
WBC # BLD AUTO: 13.7 X10*3/UL (ref 4.4–11.3)

## 2023-12-12 PROCEDURE — 71275 CT ANGIOGRAPHY CHEST: CPT

## 2023-12-12 PROCEDURE — 94760 N-INVAS EAR/PLS OXIMETRY 1: CPT

## 2023-12-12 PROCEDURE — 83735 ASSAY OF MAGNESIUM: CPT | Performed by: PHYSICIAN ASSISTANT

## 2023-12-12 PROCEDURE — 2550000001 HC RX 255 CONTRASTS: Performed by: PHYSICIAN ASSISTANT

## 2023-12-12 PROCEDURE — 85025 COMPLETE CBC W/AUTO DIFF WBC: CPT | Performed by: PHYSICIAN ASSISTANT

## 2023-12-12 PROCEDURE — 2500000004 HC RX 250 GENERAL PHARMACY W/ HCPCS (ALT 636 FOR OP/ED): Performed by: PHYSICIAN ASSISTANT

## 2023-12-12 PROCEDURE — 36415 COLL VENOUS BLD VENIPUNCTURE: CPT | Performed by: PHYSICIAN ASSISTANT

## 2023-12-12 PROCEDURE — 71045 X-RAY EXAM CHEST 1 VIEW: CPT | Mod: FY,FR

## 2023-12-12 PROCEDURE — 2500000001 HC RX 250 WO HCPCS SELF ADMINISTERED DRUGS (ALT 637 FOR MEDICARE OP)

## 2023-12-12 PROCEDURE — 96374 THER/PROPH/DIAG INJ IV PUSH: CPT

## 2023-12-12 PROCEDURE — 84484 ASSAY OF TROPONIN QUANT: CPT | Performed by: PHYSICIAN ASSISTANT

## 2023-12-12 PROCEDURE — 96376 TX/PRO/DX INJ SAME DRUG ADON: CPT

## 2023-12-12 PROCEDURE — 96375 TX/PRO/DX INJ NEW DRUG ADDON: CPT

## 2023-12-12 PROCEDURE — 84075 ASSAY ALKALINE PHOSPHATASE: CPT | Performed by: PHYSICIAN ASSISTANT

## 2023-12-12 PROCEDURE — 84484 ASSAY OF TROPONIN QUANT: CPT | Mod: 91 | Performed by: PHYSICIAN ASSISTANT

## 2023-12-12 PROCEDURE — 71045 X-RAY EXAM CHEST 1 VIEW: CPT | Mod: FOREIGN READ | Performed by: RADIOLOGY

## 2023-12-12 PROCEDURE — 2500000001 HC RX 250 WO HCPCS SELF ADMINISTERED DRUGS (ALT 637 FOR MEDICARE OP): Performed by: NURSE PRACTITIONER

## 2023-12-12 PROCEDURE — 99285 EMERGENCY DEPT VISIT HI MDM: CPT

## 2023-12-12 PROCEDURE — G0378 HOSPITAL OBSERVATION PER HR: HCPCS

## 2023-12-12 RX ORDER — TALC
3 POWDER (GRAM) TOPICAL DAILY
Status: DISCONTINUED | OUTPATIENT
Start: 2023-12-12 | End: 2023-12-13 | Stop reason: HOSPADM

## 2023-12-12 RX ORDER — ACETAMINOPHEN 160 MG/5ML
650 SOLUTION ORAL EVERY 4 HOURS PRN
Status: DISCONTINUED | OUTPATIENT
Start: 2023-12-12 | End: 2023-12-13 | Stop reason: HOSPADM

## 2023-12-12 RX ORDER — SIMVASTATIN 10 MG/1
20 TABLET, FILM COATED ORAL EVERY EVENING
Status: DISCONTINUED | OUTPATIENT
Start: 2023-12-12 | End: 2023-12-13 | Stop reason: HOSPADM

## 2023-12-12 RX ORDER — ACETAMINOPHEN 325 MG/1
650 TABLET ORAL EVERY 4 HOURS PRN
Status: DISCONTINUED | OUTPATIENT
Start: 2023-12-12 | End: 2023-12-13 | Stop reason: HOSPADM

## 2023-12-12 RX ORDER — PREGABALIN 100 MG/1
200 CAPSULE ORAL 2 TIMES DAILY
Status: DISCONTINUED | OUTPATIENT
Start: 2023-12-12 | End: 2023-12-13 | Stop reason: HOSPADM

## 2023-12-12 RX ORDER — AMLODIPINE BESYLATE 10 MG/1
10 TABLET ORAL DAILY
Status: DISCONTINUED | OUTPATIENT
Start: 2023-12-13 | End: 2023-12-13 | Stop reason: HOSPADM

## 2023-12-12 RX ORDER — DULOXETIN HYDROCHLORIDE 30 MG/1
90 CAPSULE, DELAYED RELEASE ORAL DAILY
Status: DISCONTINUED | OUTPATIENT
Start: 2023-12-13 | End: 2023-12-13 | Stop reason: HOSPADM

## 2023-12-12 RX ORDER — MORPHINE SULFATE 4 MG/ML
4 INJECTION, SOLUTION INTRAMUSCULAR; INTRAVENOUS ONCE
Status: COMPLETED | OUTPATIENT
Start: 2023-12-12 | End: 2023-12-12

## 2023-12-12 RX ORDER — QUETIAPINE FUMARATE 50 MG/1
50 TABLET, FILM COATED ORAL NIGHTLY
COMMUNITY
End: 2024-02-16 | Stop reason: WASHOUT

## 2023-12-12 RX ORDER — GUAIFENESIN 600 MG/1
600 TABLET, EXTENDED RELEASE ORAL EVERY 12 HOURS PRN
Status: DISCONTINUED | OUTPATIENT
Start: 2023-12-12 | End: 2023-12-13 | Stop reason: HOSPADM

## 2023-12-12 RX ORDER — QUETIAPINE FUMARATE 25 MG/1
25 TABLET, FILM COATED ORAL DAILY
Status: DISCONTINUED | OUTPATIENT
Start: 2023-12-13 | End: 2023-12-13 | Stop reason: HOSPADM

## 2023-12-12 RX ORDER — NAPROXEN SODIUM 220 MG/1
TABLET, FILM COATED ORAL
Status: COMPLETED
Start: 2023-12-12 | End: 2023-12-12

## 2023-12-12 RX ORDER — NAPROXEN SODIUM 220 MG/1
324 TABLET, FILM COATED ORAL ONCE
Status: COMPLETED | OUTPATIENT
Start: 2023-12-12 | End: 2023-12-12

## 2023-12-12 RX ORDER — NITROGLYCERIN 0.4 MG/1
0.4 TABLET SUBLINGUAL EVERY 5 MIN PRN
Status: DISCONTINUED | OUTPATIENT
Start: 2023-12-12 | End: 2023-12-13 | Stop reason: HOSPADM

## 2023-12-12 RX ORDER — METOPROLOL TARTRATE 50 MG/1
50 TABLET ORAL 2 TIMES DAILY
Status: DISCONTINUED | OUTPATIENT
Start: 2023-12-12 | End: 2023-12-13 | Stop reason: HOSPADM

## 2023-12-12 RX ORDER — QUETIAPINE FUMARATE 25 MG/1
25 TABLET, FILM COATED ORAL 2 TIMES DAILY
Status: DISCONTINUED | OUTPATIENT
Start: 2023-12-12 | End: 2023-12-12

## 2023-12-12 RX ORDER — ACETAMINOPHEN 650 MG/1
650 SUPPOSITORY RECTAL EVERY 4 HOURS PRN
Status: DISCONTINUED | OUTPATIENT
Start: 2023-12-12 | End: 2023-12-13 | Stop reason: HOSPADM

## 2023-12-12 RX ORDER — HYDROCODONE BITARTRATE AND ACETAMINOPHEN 5; 325 MG/1; MG/1
1 TABLET ORAL EVERY 4 HOURS PRN
Status: DISCONTINUED | OUTPATIENT
Start: 2023-12-12 | End: 2023-12-13 | Stop reason: HOSPADM

## 2023-12-12 RX ORDER — POLYETHYLENE GLYCOL 3350 17 G/17G
17 POWDER, FOR SOLUTION ORAL DAILY
Status: DISCONTINUED | OUTPATIENT
Start: 2023-12-12 | End: 2023-12-13 | Stop reason: HOSPADM

## 2023-12-12 RX ORDER — ALBUTEROL SULFATE 0.83 MG/ML
2.5 SOLUTION RESPIRATORY (INHALATION) EVERY 8 HOURS PRN
Status: DISCONTINUED | OUTPATIENT
Start: 2023-12-12 | End: 2023-12-13

## 2023-12-12 RX ORDER — ONDANSETRON HYDROCHLORIDE 2 MG/ML
4 INJECTION, SOLUTION INTRAVENOUS ONCE
Status: COMPLETED | OUTPATIENT
Start: 2023-12-12 | End: 2023-12-12

## 2023-12-12 RX ORDER — GUAIFENESIN/DEXTROMETHORPHAN 100-10MG/5
5 SYRUP ORAL EVERY 4 HOURS PRN
Status: DISCONTINUED | OUTPATIENT
Start: 2023-12-12 | End: 2023-12-13 | Stop reason: HOSPADM

## 2023-12-12 RX ORDER — NITROGLYCERIN 0.4 MG/1
TABLET SUBLINGUAL
Status: COMPLETED
Start: 2023-12-12 | End: 2023-12-12

## 2023-12-12 RX ORDER — QUETIAPINE FUMARATE 25 MG/1
50 TABLET, FILM COATED ORAL NIGHTLY
Status: DISCONTINUED | OUTPATIENT
Start: 2023-12-13 | End: 2023-12-13 | Stop reason: HOSPADM

## 2023-12-12 RX ADMIN — IOHEXOL 100 ML: 350 INJECTION, SOLUTION INTRAVENOUS at 16:01

## 2023-12-12 RX ADMIN — NITROGLYCERIN 0.4 MG: 0.4 TABLET SUBLINGUAL at 16:59

## 2023-12-12 RX ADMIN — MORPHINE SULFATE 4 MG: 4 INJECTION, SOLUTION INTRAMUSCULAR; INTRAVENOUS at 17:57

## 2023-12-12 RX ADMIN — NAPROXEN SODIUM 324 MG: 220 TABLET, FILM COATED ORAL at 16:58

## 2023-12-12 RX ADMIN — HYDROCODONE BITARTRATE AND ACETAMINOPHEN 1 TABLET: 5; 325 TABLET ORAL at 23:16

## 2023-12-12 RX ADMIN — ONDANSETRON 4 MG: 2 INJECTION INTRAMUSCULAR; INTRAVENOUS at 15:43

## 2023-12-12 RX ADMIN — ASPIRIN 81 MG CHEWABLE TABLET 324 MG: 81 TABLET CHEWABLE at 16:58

## 2023-12-12 RX ADMIN — MORPHINE SULFATE 4 MG: 4 INJECTION, SOLUTION INTRAMUSCULAR; INTRAVENOUS at 15:44

## 2023-12-12 SDOH — SOCIAL STABILITY: SOCIAL INSECURITY: DO YOU FEEL ANYONE HAS EXPLOITED OR TAKEN ADVANTAGE OF YOU FINANCIALLY OR OF YOUR PERSONAL PROPERTY?: NO

## 2023-12-12 SDOH — SOCIAL STABILITY: SOCIAL INSECURITY: ARE YOU OR HAVE YOU BEEN THREATENED OR ABUSED PHYSICALLY, EMOTIONALLY, OR SEXUALLY BY ANYONE?: NO

## 2023-12-12 SDOH — SOCIAL STABILITY: SOCIAL INSECURITY: WERE YOU ABLE TO COMPLETE ALL THE BEHAVIORAL HEALTH SCREENINGS?: YES

## 2023-12-12 SDOH — SOCIAL STABILITY: SOCIAL INSECURITY: HAS ANYONE EVER THREATENED TO HURT YOUR FAMILY OR YOUR PETS?: NO

## 2023-12-12 SDOH — SOCIAL STABILITY: SOCIAL INSECURITY: DOES ANYONE TRY TO KEEP YOU FROM HAVING/CONTACTING OTHER FRIENDS OR DOING THINGS OUTSIDE YOUR HOME?: NO

## 2023-12-12 SDOH — SOCIAL STABILITY: SOCIAL INSECURITY: HAVE YOU HAD THOUGHTS OF HARMING ANYONE ELSE?: NO

## 2023-12-12 SDOH — SOCIAL STABILITY: SOCIAL INSECURITY: DO YOU FEEL UNSAFE GOING BACK TO THE PLACE WHERE YOU ARE LIVING?: NO

## 2023-12-12 SDOH — SOCIAL STABILITY: SOCIAL INSECURITY: ABUSE: ADULT

## 2023-12-12 SDOH — SOCIAL STABILITY: SOCIAL INSECURITY: ARE THERE ANY APPARENT SIGNS OF INJURIES/BEHAVIORS THAT COULD BE RELATED TO ABUSE/NEGLECT?: NO

## 2023-12-12 ASSESSMENT — COGNITIVE AND FUNCTIONAL STATUS - GENERAL
DAILY ACTIVITIY SCORE: 24
MOBILITY SCORE: 24
MOBILITY SCORE: 24
PATIENT BASELINE BEDBOUND: NO
DAILY ACTIVITIY SCORE: 24

## 2023-12-12 ASSESSMENT — PAIN DESCRIPTION - LOCATION
LOCATION: OTHER (COMMENT)
LOCATION: ABDOMEN
LOCATION: ABDOMEN
LOCATION: CHEST

## 2023-12-12 ASSESSMENT — ACTIVITIES OF DAILY LIVING (ADL)
HEARING - LEFT EAR: FUNCTIONAL
LACK_OF_TRANSPORTATION: NO
WALKS IN HOME: INDEPENDENT
TOILETING: INDEPENDENT
PATIENT'S MEMORY ADEQUATE TO SAFELY COMPLETE DAILY ACTIVITIES?: YES
DRESSING YOURSELF: INDEPENDENT
GROOMING: INDEPENDENT
ADEQUATE_TO_COMPLETE_ADL: YES
HEARING - RIGHT EAR: FUNCTIONAL
BATHING: INDEPENDENT
JUDGMENT_ADEQUATE_SAFELY_COMPLETE_DAILY_ACTIVITIES: YES
FEEDING YOURSELF: INDEPENDENT

## 2023-12-12 ASSESSMENT — LIFESTYLE VARIABLES
SKIP TO QUESTIONS 9-10: 1
AUDIT-C TOTAL SCORE: 0
HOW OFTEN DO YOU HAVE 6 OR MORE DRINKS ON ONE OCCASION: NEVER
HOW MANY STANDARD DRINKS CONTAINING ALCOHOL DO YOU HAVE ON A TYPICAL DAY: PATIENT DOES NOT DRINK
HOW OFTEN DO YOU HAVE A DRINK CONTAINING ALCOHOL: NEVER
AUDIT-C TOTAL SCORE: 0

## 2023-12-12 ASSESSMENT — PAIN SCALES - GENERAL
PAINLEVEL_OUTOF10: 8
PAINLEVEL_OUTOF10: 7

## 2023-12-12 ASSESSMENT — PAIN - FUNCTIONAL ASSESSMENT
PAIN_FUNCTIONAL_ASSESSMENT: 0-10

## 2023-12-12 ASSESSMENT — COLUMBIA-SUICIDE SEVERITY RATING SCALE - C-SSRS
2. HAVE YOU ACTUALLY HAD ANY THOUGHTS OF KILLING YOURSELF?: NO
1. IN THE PAST MONTH, HAVE YOU WISHED YOU WERE DEAD OR WISHED YOU COULD GO TO SLEEP AND NOT WAKE UP?: NO
6. HAVE YOU EVER DONE ANYTHING, STARTED TO DO ANYTHING, OR PREPARED TO DO ANYTHING TO END YOUR LIFE?: NO

## 2023-12-12 ASSESSMENT — PAIN DESCRIPTION - DESCRIPTORS: DESCRIPTORS: ACHING

## 2023-12-12 ASSESSMENT — PAIN DESCRIPTION - PAIN TYPE: TYPE: ACUTE PAIN

## 2023-12-12 ASSESSMENT — PATIENT HEALTH QUESTIONNAIRE - PHQ9
2. FEELING DOWN, DEPRESSED OR HOPELESS: NOT AT ALL
1. LITTLE INTEREST OR PLEASURE IN DOING THINGS: NOT AT ALL
SUM OF ALL RESPONSES TO PHQ9 QUESTIONS 1 & 2: 0

## 2023-12-12 ASSESSMENT — HEART SCORE
HEART SCORE: 5
ECG: NON-SPECIFIC REPOLARIZATION DISTURBANCE
TROPONIN: LESS THAN OR EQUAL TO NORMAL LIMIT
RISK FACTORS: >2 RISK FACTORS OR HX OF ATHEROSCLEROTIC DISEASE
AGE: 65+
HISTORY: SLIGHTLY SUSPICIOUS

## 2023-12-12 ASSESSMENT — PAIN DESCRIPTION - FREQUENCY: FREQUENCY: CONSTANT/CONTINUOUS

## 2023-12-12 ASSESSMENT — PAIN DESCRIPTION - ORIENTATION
ORIENTATION: LEFT
ORIENTATION: LEFT;MID

## 2023-12-12 NOTE — ED PROVIDER NOTES
HPI   Chief Complaint   Patient presents with    Chest Pain     Chest and abdominal pain for two days       Is a 68-year-old male coming in for chest pain and abdominal pain.  Patient reports that the chest pain has been present for the last 8 days.  Patient denies any prior cardiac history.  States his father had cardiac history however he has not had any cardiac conditions other than an episode of atrial fibrillation when he had pancreatitis.  He does not see a cardiologist.  Patient is not on anticoagulation medication.  States that the pain has been present to the mid chest and then a few days ago he was moving when he started feeling some abdominal discomfort to the left flank.  Patient denies feeling short of breath more than usual but does state he has a history of COPD.  He denies any vomiting or diarrhea.  No fevers or chills.  He denies any aggravating or alleviating factors of his symptoms.      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    Hypertension     Malignant carcinoid tumor of appendix (CMS/Formerly [facility]) 05/03/2023    Neuralgia and neuritis, unspecified     Neuritis    Nontoxic single thyroid nodule     Solitary thyroid nodule    Other specified anxiety disorders     Depression with anxiety    Personal history of benign carcinoid tumor 12/16/2015    History of benign carcinoid tumor     Past Surgical History:   Procedure Laterality Date    COLONOSCOPY  05/20/2013    Complete Colonoscopy    OTHER SURGICAL HISTORY  03/18/2013    Excision Of Burn Tissue Shoulder    OTHER SURGICAL HISTORY   03/04/2021    Pancreatic surgery    SHOULDER SURGERY  04/01/2013    Shoulder Surgery    SMALL INTESTINE SURGERY  06/30/2017    Small Bowel Resection     Family History   Problem Relation Name Age of Onset    Lung cancer Mother      Heart failure Father       Social History     Tobacco Use    Smoking status: Every Day     Packs/day: 1.00     Years: 40.00     Additional pack years: 0.00     Total pack years: 40.00     Types: Cigarettes    Smokeless tobacco: Never   Substance Use Topics    Alcohol use: Not Currently    Drug use: Never       Physical Exam   ED Triage Vitals [12/12/23 1446]   Temp Heart Rate Resp BP   36.3 °C (97.4 °F) 69 16 147/74      SpO2 Temp src Heart Rate Source Patient Position   97 % -- -- Sitting      BP Location FiO2 (%)     Left arm --       Physical Exam  Vitals and nursing note reviewed.   Constitutional:       General: He is not in acute distress.     Appearance: Normal appearance. He is not toxic-appearing.   HENT:      Head: Normocephalic and atraumatic.      Nose: Nose normal.      Mouth/Throat:      Mouth: Mucous membranes are moist.      Pharynx: Oropharynx is clear.   Eyes:      Extraocular Movements: Extraocular movements intact.      Conjunctiva/sclera: Conjunctivae normal.      Pupils: Pupils are equal, round, and reactive to light.   Cardiovascular:      Rate and Rhythm: Regular rhythm.      Pulses: Normal pulses.      Heart sounds: Normal heart sounds.   Pulmonary:      Effort: Pulmonary effort is normal. No respiratory distress.      Breath sounds: Normal breath sounds.   Abdominal:      General: Abdomen is flat. Bowel sounds are normal.      Palpations: Abdomen is soft.      Tenderness: There is no abdominal tenderness.   Musculoskeletal:         General: Normal range of motion.      Cervical back: Normal range of motion and neck supple.   Skin:     General: Skin is warm and dry.      Coloration: Skin is not jaundiced or pale.      Findings: No bruising.      Comments:  Surgical scar present to the lower abdomen.   Neurological:      General: No focal deficit present.      Mental Status: He is alert and oriented to person, place, and time. Mental status is at baseline.   Psychiatric:         Mood and Affect: Mood normal.         Behavior: Behavior normal.         ED Course & MDM   ED Course as of 12/12/23 1747   e Dec 12, 2023   1540 EKG completed at 1449 shows on my interpretation normal sinus rhythm.  There is T wave inversions in V1 and V2.  There is a right bundle branch block present.  QTc 450 ms. [RS]      ED Course User Index  [RS] Juanjo Genao PA-C         Diagnoses as of 12/12/23 1747   Chest pain, unspecified type   Left lower quadrant abdominal pain       Medical Decision Making  Summary:  Medical Decision Making:   Patient presented as described in HPI. Patient case including ROS, PE, and treatment and plan discussed with ED attending if attached as cosigner. Results from labs and or imaging included below if completed. Juanjo Alexis  is a 68 y.o. coming in for Patient presents with:  Chest Pain: Chest and abdominal pain for two days  .  Due to patient's chest pain as well as abdominal pain CT angio of the chest abdomen pelvis will be completed.  Lab work shows normal troponin x 2.  He is given morphine for his pain.  His pain slightly improved after morphine.  After getting imaging he did come back and reported increased pain.  Patient was given aspirin as well as nitro.  He reports that slightly helped.  He reports that it did help a little bit more than morphine.  CTA is negative for any acute abnormalities.  Lab work shows no acute abnormalities of concern.  Due to patient's elevated heart score and unknown cause of his discomfort patient will be hospitalized for further evaluation and treatment.  Patient is given another dose of morphine for further pain control.  Patient will be hospitalized and monitored.     I also explained the plan and treatment course.  Patient/guardian is in agreement with plan, treatment course, and states verbally that they will comply.    Labs Reviewed  CBC WITH AUTO DIFFERENTIAL - Abnormal     WBC                           13.7 (*)               nRBC                          0.0                    RBC                           4.68                   Hemoglobin                    14.0                   Hematocrit                    42.6                   MCV                           91                     MCH                           29.9                   MCHC                          32.9                   RDW                           14.7 (*)               Platelets                     287                    Neutrophils %                 66.2                   Immature Granulocytes %, Automated   0.6                    Lymphocytes %                 18.4                   Monocytes %                   8.9                    Eosinophils %                 5.0                    Basophils %                   0.9                    Neutrophils Absolute          9.10 (*)               Immature Granulocytes Absolute, Au*   0.08                   Lymphocytes Absolute          2.53                   Monocytes Absolute            1.22 (*)               Eosinophils Absolute          0.69                   Basophils Absolute            0.12 (*)            COMPREHENSIVE METABOLIC PANEL - Abnormal     Glucose                       130 (*)                Sodium                        139                    Potassium                     4.1                    Chloride                      105                    Bicarbonate                   27                     Anion Gap                     11                     Urea Nitrogen                 11                     Creatinine                    1.22                   eGFR                          65                     Calcium                       8.6                    Albumin                        4.0                    Alkaline Phosphatase          96                     Total Protein                 6.8                    AST                           12                     Bilirubin, Total              0.4                    ALT                           10                  MAGNESIUM - Normal     Magnesium                     1.88                SERIAL TROPONIN-INITIAL - Normal     Troponin I, High Sensitivity   5                        Narrative: Less than 99th percentile of normal range cutoff-                Female and children under 18 years old <14 ng/L; Male <21 ng/L: Negative                Repeat testing should be performed if clinically indicated.                                 Female and children under 18 years old 14-50 ng/L; Male 21-50 ng/L:                Consistent with possible cardiac damage and possible increased clinical                 risk. Serial measurements may help to assess extent of myocardial damage.                                 >50 ng/L: Consistent with cardiac damage, increased clinical risk and                myocardial infarction. Serial measurements may help assess extent of                 myocardial damage.                                  NOTE: Children less than 1 year old may have higher baseline troponin                 levels and results should be interpreted in conjunction with the overall                 clinical context.                                 NOTE: Troponin I testing is performed using a different                 testing methodology at [facility] than at other                 [facility]. Direct result comparisons should only                 be made within the same method.  SERIAL TROPONIN, 1 HOUR - Normal     Troponin I, High Sensitivity   4                        Narrative: Less than 99th percentile of normal range cutoff-                Female and children under 18 years old <14 ng/L; Male <21 ng/L: Negative                Repeat  testing should be performed if clinically indicated.                                 Female and children under 18 years old 14-50 ng/L; Male 21-50 ng/L:                Consistent with possible cardiac damage and possible increased clinical                 risk. Serial measurements may help to assess extent of myocardial damage.                                 >50 ng/L: Consistent with cardiac damage, increased clinical risk and                myocardial infarction. Serial measurements may help assess extent of                 myocardial damage.                                  NOTE: Children less than 1 year old may have higher baseline troponin                 levels and results should be interpreted in conjunction with the overall                 clinical context.                                 NOTE: Troponin I testing is performed using a different                 testing methodology at [facility] than at other                 [facility]. Direct result comparisons should only                 be made within the same method.  TROPONIN SERIES- (INITIAL, 1 HR)       Narrative: The following orders were created for panel order Troponin I Series, High Sensitivity (0, 1 HR).                Procedure                               Abnormality         Status                                   ---------                               -----------         ------                                   Troponin I, High Sensiti...[[id number]]  Normal              Final result                             Troponin, High Sensitivi...[[id number]]  Normal              Final result                                             Please view results for these tests on the individual orders.  LIPID PANEL   CT angio chest abdomen pelvis   Final Result    CHEST:    1. No acute chest pathology. No aortic dissection or aneurysm.    2. Emphysematous changes of the lungs with no acute process.          ABDOMEN AND PELVIS:    1.  No  acute abdominal or pelvic pathology.    2. No abdominal aortic aneurysm or rupture.    3. 9 simple renal cysts.    4. Bladder wall thickening and trabeculation with a small 0.9 cm left    lateral bladder diverticulum.    5. Anastomotic sutures ascending colon.          MACRO:    None          Signed by: Val Llanes 12/12/2023 4:43 PM    Dictation workstation:   [id number]     XR chest 1 view   Final Result    Mild hyperinflation or chronic obstructive pulmonary disease.  There    does not appear to be a significant change from the prior chest    x-ray..    Signed by Praneeth Montana MD          Tests/Medications/Escalations of Care considered but not given: As in MDM    Patient care discussed with: N/A  Social Determinants affecting care: N/A    Final diagnosis and disposition as documented       Hospitalize. I discussed the differential; results and admit plan with the patient and/or family/friend/caregiver if present.  I emphasized the importance of hospitalization need for re-evaluation/continued monitoring/interventions.. They agreed that if they feel their condition is worsening or if they have any other concern they should alert staff immediately for further assistance. I gave the patient an opportunity to ask all questions they had and answered all of them accordingly. The patient and/or family/friend/caregiver expressed understanding verbally and that they would comply.    Disposition:  Hospitalize to tele floor under Dr. Lucio per their orders. Discussed findings and treatment done here in ED with admitting physician. It would be a risk to discharge the patient in their condition due to possibility of deterioration in their condition and the need for urgent interventions.    This note has been transcribed using voice recognition and may contain grammatical errors, misplaced words, incorrect words, incorrect phrases or other errors.         Procedure  Procedures     Juanjo Genao PA-C  12/12/23 7059

## 2023-12-13 ENCOUNTER — APPOINTMENT (OUTPATIENT)
Dept: CARDIOLOGY | Facility: HOSPITAL | Age: 68
End: 2023-12-13
Payer: MEDICARE

## 2023-12-13 VITALS
TEMPERATURE: 97.9 F | HEIGHT: 71 IN | RESPIRATION RATE: 18 BRPM | OXYGEN SATURATION: 93 % | HEART RATE: 64 BPM | SYSTOLIC BLOOD PRESSURE: 153 MMHG | DIASTOLIC BLOOD PRESSURE: 65 MMHG | BODY MASS INDEX: 33.46 KG/M2 | WEIGHT: 239 LBS

## 2023-12-13 PROBLEM — Z72.0 TOBACCO ABUSE: Status: ACTIVE | Noted: 2023-12-13

## 2023-12-13 PROBLEM — M47.812 CERVICAL SPONDYLOSIS: Status: ACTIVE | Noted: 2023-12-13

## 2023-12-13 PROBLEM — R10.9 ABDOMINAL PAIN: Status: ACTIVE | Noted: 2023-12-13

## 2023-12-13 LAB
ALBUMIN SERPL BCP-MCNC: 3.7 G/DL (ref 3.4–5)
ALP SERPL-CCNC: 80 U/L (ref 33–136)
ALT SERPL W P-5'-P-CCNC: 10 U/L (ref 10–52)
ANION GAP SERPL CALC-SCNC: 12 MMOL/L (ref 10–20)
AST SERPL W P-5'-P-CCNC: 10 U/L (ref 9–39)
ATRIAL RATE: 69 BPM
BILIRUB SERPL-MCNC: 0.3 MG/DL (ref 0–1.2)
BUN SERPL-MCNC: 9 MG/DL (ref 6–23)
CALCIUM SERPL-MCNC: 8.3 MG/DL (ref 8.6–10.3)
CHLORIDE SERPL-SCNC: 109 MMOL/L (ref 98–107)
CHOLEST SERPL-MCNC: 112 MG/DL (ref 0–199)
CHOLESTEROL/HDL RATIO: 3.6
CO2 SERPL-SCNC: 25 MMOL/L (ref 21–32)
CREAT SERPL-MCNC: 1.01 MG/DL (ref 0.5–1.3)
ERYTHROCYTE [DISTWIDTH] IN BLOOD BY AUTOMATED COUNT: 14.7 % (ref 11.5–14.5)
GFR SERPL CREATININE-BSD FRML MDRD: 82 ML/MIN/1.73M*2
GLUCOSE SERPL-MCNC: 99 MG/DL (ref 74–99)
HCT VFR BLD AUTO: 39.8 % (ref 41–52)
HDLC SERPL-MCNC: 31.5 MG/DL
HGB BLD-MCNC: 12.8 G/DL (ref 13.5–17.5)
LDLC SERPL CALC-MCNC: 66 MG/DL
MCH RBC QN AUTO: 29.2 PG (ref 26–34)
MCHC RBC AUTO-ENTMCNC: 32.2 G/DL (ref 32–36)
MCV RBC AUTO: 91 FL (ref 80–100)
NON HDL CHOLESTEROL: 81 MG/DL (ref 0–149)
NRBC BLD-RTO: ABNORMAL /100{WBCS}
P AXIS: 100 DEGREES
P OFFSET: 176 MS
P ONSET: 120 MS
PLATELET # BLD AUTO: 253 X10*3/UL (ref 150–450)
POTASSIUM SERPL-SCNC: 4 MMOL/L (ref 3.5–5.3)
PR INTERVAL: 196 MS
PROT SERPL-MCNC: 6 G/DL (ref 6.4–8.2)
Q ONSET: 218 MS
QRS COUNT: 11 BEATS
QRS DURATION: 136 MS
QT INTERVAL: 420 MS
QTC CALCULATION(BAZETT): 450 MS
QTC FREDERICIA: 440 MS
R AXIS: 22 DEGREES
RBC # BLD AUTO: 4.38 X10*6/UL (ref 4.5–5.9)
SODIUM SERPL-SCNC: 142 MMOL/L (ref 136–145)
T AXIS: 41 DEGREES
T OFFSET: 428 MS
TRIGL SERPL-MCNC: 73 MG/DL (ref 0–149)
VENTRICULAR RATE: 69 BPM
VLDL: 15 MG/DL (ref 0–40)
WBC # BLD AUTO: 11.4 X10*3/UL (ref 4.4–11.3)

## 2023-12-13 PROCEDURE — 99222 1ST HOSP IP/OBS MODERATE 55: CPT | Performed by: NURSE PRACTITIONER

## 2023-12-13 PROCEDURE — G0378 HOSPITAL OBSERVATION PER HR: HCPCS

## 2023-12-13 PROCEDURE — 36415 COLL VENOUS BLD VENIPUNCTURE: CPT | Performed by: NURSE PRACTITIONER

## 2023-12-13 PROCEDURE — 93005 ELECTROCARDIOGRAM TRACING: CPT

## 2023-12-13 PROCEDURE — 80053 COMPREHEN METABOLIC PANEL: CPT | Performed by: NURSE PRACTITIONER

## 2023-12-13 PROCEDURE — 85027 COMPLETE CBC AUTOMATED: CPT | Performed by: NURSE PRACTITIONER

## 2023-12-13 PROCEDURE — 2500000001 HC RX 250 WO HCPCS SELF ADMINISTERED DRUGS (ALT 637 FOR MEDICARE OP): Performed by: NURSE PRACTITIONER

## 2023-12-13 PROCEDURE — 2500000004 HC RX 250 GENERAL PHARMACY W/ HCPCS (ALT 636 FOR OP/ED): Mod: MUE | Performed by: NURSE PRACTITIONER

## 2023-12-13 PROCEDURE — 9420000001 HC RT PATIENT EDUCATION 5 MIN

## 2023-12-13 PROCEDURE — 80061 LIPID PANEL: CPT | Performed by: INTERNAL MEDICINE

## 2023-12-13 RX ORDER — ALBUTEROL SULFATE 0.83 MG/ML
2.5 SOLUTION RESPIRATORY (INHALATION) EVERY 2 HOUR PRN
Status: DISCONTINUED | OUTPATIENT
Start: 2023-12-13 | End: 2023-12-13 | Stop reason: HOSPADM

## 2023-12-13 RX ORDER — DOXYCYCLINE 100 MG/1
100 CAPSULE ORAL 2 TIMES DAILY
Qty: 14 CAPSULE | Refills: 0 | Status: SHIPPED | OUTPATIENT
Start: 2023-12-13 | End: 2023-12-20

## 2023-12-13 RX ORDER — FLUTICASONE FUROATE AND VILANTEROL 200; 25 UG/1; UG/1
1 POWDER RESPIRATORY (INHALATION)
Status: DISCONTINUED | OUTPATIENT
Start: 2023-12-13 | End: 2023-12-13 | Stop reason: HOSPADM

## 2023-12-13 RX ADMIN — SIMVASTATIN 20 MG: 10 TABLET, FILM COATED ORAL at 00:04

## 2023-12-13 RX ADMIN — METOPROLOL TARTRATE 50 MG: 50 TABLET, FILM COATED ORAL at 08:47

## 2023-12-13 RX ADMIN — AMLODIPINE BESYLATE 10 MG: 10 TABLET ORAL at 08:47

## 2023-12-13 RX ADMIN — PREGABALIN 200 MG: 100 CAPSULE ORAL at 00:32

## 2023-12-13 RX ADMIN — QUETIAPINE FUMARATE 50 MG: 25 TABLET ORAL at 00:04

## 2023-12-13 RX ADMIN — QUETIAPINE FUMARATE 25 MG: 25 TABLET ORAL at 08:48

## 2023-12-13 RX ADMIN — PREGABALIN 200 MG: 100 CAPSULE ORAL at 08:47

## 2023-12-13 RX ADMIN — METOPROLOL TARTRATE 50 MG: 50 TABLET, FILM COATED ORAL at 00:08

## 2023-12-13 RX ADMIN — DULOXETINE HYDROCHLORIDE 90 MG: 30 CAPSULE, DELAYED RELEASE ORAL at 08:48

## 2023-12-13 ASSESSMENT — ENCOUNTER SYMPTOMS
ABDOMINAL PAIN: 1
MUSCULOSKELETAL NEGATIVE: 1
PSYCHIATRIC NEGATIVE: 1
EYES NEGATIVE: 1
ENDOCRINE NEGATIVE: 1
CHEST TIGHTNESS: 1
HEMATOLOGIC/LYMPHATIC NEGATIVE: 1
NEUROLOGICAL NEGATIVE: 1
CONSTITUTIONAL NEGATIVE: 1
SHORTNESS OF BREATH: 1
ALLERGIC/IMMUNOLOGIC NEGATIVE: 1

## 2023-12-13 ASSESSMENT — ACTIVITIES OF DAILY LIVING (ADL): LACK_OF_TRANSPORTATION: NO

## 2023-12-13 ASSESSMENT — PAIN - FUNCTIONAL ASSESSMENT
PAIN_FUNCTIONAL_ASSESSMENT: 0-10

## 2023-12-13 ASSESSMENT — PAIN SCALES - GENERAL
PAINLEVEL_OUTOF10: 0 - NO PAIN
PAINLEVEL_OUTOF10: 5 - MODERATE PAIN
PAINLEVEL_OUTOF10: 2

## 2023-12-13 NOTE — H&P
History Of Present Illness  uJanjo Alexis is a 68 y.o. male with PMH of COPD, chronic pancreatitis, HTN, depression/anxiety who presented to ED with 8 days of chest pain and left sided abdominal pain. He stated that his pain was present in his mid chest this week, then a few days ago he was moving something when he felt the pain in his left side. His ED workup included a CT C/A/P that showed no acute process, troponins  5>4, WBC 13.7. Pt was given nitroglycerin and aspirin and due to his elevated heart score, he is admitted to Medicine for further evaluation and treatment with consult for Cardiology.       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    Hypertension     Malignant carcinoid tumor of appendix (CMS/Former[facility]) 05/03/2023    Neuralgia and neuritis, unspecified     Neuritis    Nontoxic single thyroid nodule     Solitary thyroid nodule    Other specified anxiety disorders     Depression with anxiety    Personal history of benign carcinoid tumor 12/16/2015    History of benign carcinoid tumor     Surgical History  Past Surgical History:   Procedure Laterality Date    COLONOSCOPY  05/20/2013    Complete Colonoscopy    OTHER SURGICAL HISTORY  03/18/2013    Excision Of Burn Tissue Shoulder    OTHER SURGICAL HISTORY  03/04/2021    Pancreatic surgery    SHOULDER SURGERY  04/01/2013    Shoulder Surgery    SMALL INTESTINE SURGERY  06/30/2017    Small Bowel Resection      Social History  He reports that he has been smoking cigarettes. He has a 40.00 pack-year smoking history. He has never used smokeless tobacco. He reports that he does not currently use alcohol. He reports that he does not  use drugs.    Family History  Family History   Problem Relation Name Age of Onset    Lung cancer Mother      Heart failure Father        Allergies  Amoxicillin-pot clavulanate, Azithromycin, Codeine, Hydrochlorothiazide, and Ondansetron hcl    Review of Systems   Constitutional: Negative.    HENT: Negative.     Eyes: Negative.    Respiratory:  Positive for chest tightness and shortness of breath.    Cardiovascular:  Positive for chest pain.   Gastrointestinal:  Positive for abdominal pain.   Endocrine: Negative.    Genitourinary: Negative.    Musculoskeletal: Negative.    Skin: Negative.    Allergic/Immunologic: Negative.    Neurological: Negative.    Hematological: Negative.    Psychiatric/Behavioral: Negative.        Physical Exam  Constitutional:       General: He is not in acute distress.     Appearance: Normal appearance. He is not toxic-appearing.   HENT:      Head: Normocephalic and atraumatic.      Mouth/Throat:      Mouth: Mucous membranes are moist.   Eyes:      Extraocular Movements: Extraocular movements intact.      Pupils: Pupils are equal, round, and reactive to light.   Cardiovascular:      Rate and Rhythm: Normal rate and regular rhythm.      Pulses: Normal pulses.      Heart sounds: Normal heart sounds. No murmur heard.     No gallop.   Pulmonary:      Effort: Pulmonary effort is normal. No respiratory distress.      Breath sounds: Normal breath sounds. No wheezing, rhonchi or rales.   Abdominal:      General: Bowel sounds are normal. There is no distension.      Palpations: Abdomen is soft.      Tenderness: There is no abdominal tenderness. There is no guarding or rebound.      Comments: Left flank pain   Musculoskeletal:         General: No swelling, tenderness, deformity or signs of injury. Normal range of motion.      Cervical back: Normal range of motion and neck supple.   Skin:     General: Skin is warm and dry.      Capillary Refill: Capillary refill takes less than 2 seconds.       "Coloration: Skin is not jaundiced.      Findings: No bruising or rash.   Neurological:      General: No focal deficit present.      Mental Status: He is alert and oriented to person, place, and time.      Cranial Nerves: No cranial nerve deficit.      Sensory: No sensory deficit.      Motor: No weakness.      Gait: Gait normal.   Psychiatric:         Mood and Affect: Mood normal.         Behavior: Behavior normal.         Thought Content: Thought content normal.         Judgment: Judgment normal.     Last Recorded Vitals  Blood pressure 154/63, pulse 75, temperature 36.8 °C (98.2 °F), temperature source Temporal, resp. rate 18, height 1.803 m (5' 11\"), weight 108 kg (239 lb), SpO2 94 %.    Scheduled medications  amLODIPine, 10 mg, oral, Daily  DULoxetine, 90 mg, oral, Daily  fluticasone-umeclidin-vilanter, 1 puff, inhalation, Daily  melatonin, 3 mg, oral, Daily  metoprolol tartrate, 50 mg, oral, BID  polyethylene glycol, 17 g, oral, Daily  pregabalin, 200 mg, oral, BID  QUEtiapine, 25 mg, oral, Daily  QUEtiapine, 50 mg, oral, Nightly  simvastatin, 20 mg, oral, q PM    PRN medications  PRN medications: acetaminophen **OR** acetaminophen **OR** acetaminophen, albuterol, benzocaine-menthol, dextromethorphan-guaifenesin, guaiFENesin, HYDROcodone-acetaminophen, nitroglycerin    Relevant Results  ECG 12 lead  Result Date: 12/13/2023  Normal sinus rhythm Right bundle branch block     CT angio chest abdomen pelvis  Result Date: 12/12/2023  CHEST: 1. No acute chest pathology. No aortic dissection or aneurysm. 2. Emphysematous changes of the lungs with no acute process.   ABDOMEN AND PELVIS: 1.  No acute abdominal or pelvic pathology. 2. No abdominal aortic aneurysm or rupture. 3. 9 simple renal cysts. 4. Bladder wall thickening and trabeculation with a small 0.9 cm left lateral bladder diverticulum. 5. Anastomotic sutures ascending colon.       XR chest 1 view  Result Date: 12/12/2023  Mild hyperinflation or chronic " obstructive pulmonary disease.  There does not appear to be a significant change from the prior chest x-ray..         Latest Reference Range & Units 12/12/23 15:00 12/12/23 16:18 12/13/23 06:11   GLUCOSE 74 - 99 mg/dL 130 (H)  99   SODIUM 136 - 145 mmol/L 139  142   POTASSIUM 3.5 - 5.3 mmol/L 4.1  4.0   CHLORIDE 98 - 107 mmol/L 105  109 (H)   Bicarbonate 21 - 32 mmol/L 27  25   Anion Gap 10 - 20 mmol/L 11  12   Blood Urea Nitrogen 6 - 23 mg/dL 11  9   Creatinine 0.50 - 1.30 mg/dL 1.22  1.01   EGFR >60 mL/min/1.73m*2 65  82   Calcium 8.6 - 10.3 mg/dL 8.6  8.3 (L)   Albumin 3.4 - 5.0 g/dL 4.0  3.7   Alkaline Phosphatase 33 - 136 U/L 96  80   ALT 10 - 52 U/L 10  10   AST 9 - 39 U/L 12  10   Bilirubin Total 0.0 - 1.2 mg/dL 0.4  0.3   HDL CHOLESTEROL mg/dL   31.5   Cholesterol/HDL Ratio    3.6   LDL Calculated <=99 mg/dL   66   VLDL 0 - 40 mg/dL   15   TRIGLYCERIDES 0 - 149 mg/dL   73   Non HDL Cholesterol 0 - 149 mg/dL   81   Total Protein 6.4 - 8.2 g/dL 6.8  6.0 (L)   MAGNESIUM 1.60 - 2.40 mg/dL 1.88     CHOLESTEROL 0 - 199 mg/dL   112   Troponin I, High Sensitivity 0 - 20 ng/L 5 4       Latest Reference Range & Units 12/12/23 15:00 12/13/23 06:11   WBC 4.4 - 11.3 x10*3/uL 13.7 (H) 11.4 (H)   nRBC 0.0 - 0.0 /100 WBCs 0.0 COMMENT ONLY   RBC 4.50 - 5.90 x10*6/uL 4.68 4.38 (L)   HEMOGLOBIN 13.5 - 17.5 g/dL 14.0 12.8 (L)   HEMATOCRIT 41.0 - 52.0 % 42.6 39.8 (L)   MCV 80 - 100 fL 91 91   MCH 26.0 - 34.0 pg 29.9 29.2   MCHC 32.0 - 36.0 g/dL 32.9 32.2   RED CELL DISTRIBUTION WIDTH 11.5 - 14.5 % 14.7 (H) 14.7 (H)   Platelets 150 - 450 x10*3/uL 287 253     Assessment/Plan   Principal Problem:    Chest pain at rest  Active Problems:    Chronic obstructive pulmonary disease (CMS/HCC)    Atrial fibrillation (CMS/HCC)    Depression with anxiety    GERD (gastroesophageal reflux disease)    Essential hypertension    Hyperlipidemia    Obstructive sleep apnea    Tobacco abuse    Abdominal pain    Cervical spondylosis    #Chest pain at  rest  #Atrial fibrillation (CMS/HCC)  #Essential hypertension  #Hyperlipidemia  - chest pain x 8 days  - troponin 5 > 4  - remote history of atrial fibrillation with no follow up  - EKG SR, RBBB  - CT C/A/P: CHEST: 1. No acute chest pathology. No aortic dissection or aneurysm. 2. Emphysematous changes of the lungs with no acute process.   - Lipid panel: HDL 31.5, LDL 66, trig 73, chol 112  - continue amlodipine, metoprolol, statin  - telemetry  - cardiology consulted, appreciate recs    #Chronic obstructive pulmonary disease (CMS/[facility])  #Obstructive sleep apnea  #Tobacco abuse  - current 1 PPD smoker, home rolled, no filter  - sees Pulm outpatient, not regularly  - non-compliant with CPAP  - continue spiriva, PRN albuterol  - encourage regular f/up with Pulm  - encourage smoking cessation  - pulse ox monitoring @[facility]    #GERD (gastroesophageal reflux disease)  #Abdominal pain  - CT C/A/P: ABDOMEN AND PELVIS: 1.  No acute abdominal or pelvic pathology. 2. No abdominal aortic aneurysm or rupture. 3. 9 simple renal cysts. 4. Bladder wall thickening and trabeculation with a small 0.9 cm left lateral bladder diverticulum. 5. Anastomotic sutures ascending colon.  - continue PPI  - pain meds PRN    #Cervical Spondylosis  - continue lyrica    #Depression with anxiety  - continue quetiapine, duloxetine    GI ppx: PPI  DVT ppx: ambulation    Fluids: as needed  Electrolytes: replace as needed  Nutrition: cardiac  Adjuncts: PIV    Code Status: full  Pt requires inpatient stay at this time.  Total accumulated time spent face to face and not face to face preparing to see the patient, obtaining and reviewing separately obtained history; performing a medically appropriate examination and/or evaluation; counseling and educating the patient, family; ordering medications, tests, or procedures; referring and communicating with other health care professionals; documenting clinical information in the patient's medical record; independently  interpreting results and communicating the results to the patient, family; and care coordination was 45 minutes.    Uyen Montero, APRN-CNP

## 2023-12-13 NOTE — PROGRESS NOTES
Discussed plan of care in interdisciplinary rounds,  patient admitted with chest pain, cardiology consult pending.    Met with patient at bedside to discuss discharge planning, wife present at time of assessment.   Patient is A&OX3 from home with his spouse, they reside in a two story home with four steps to enter, they do not use the second level of the home.   Prior to admission patient was independent in all ADL's and IADL's, uses no DME, and still drives.   Patient has home CPAP, but does not use it, and he uses home nebulizer as needed.  PCP Dr. Zhang.  Patient feels they have all the help they need at home and are currently denying the need for additional services and/or resources on discharge.        Home no needs  DC plan secure
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
## 2023-12-13 NOTE — DISCHARGE INSTR - OTHER ORDERS
Thank you for choosing [facility] for your Health Care needs.  As you transition from the hospital back to home, we hope we took your preferences into account on how you manage your health needs so you can manage your health at home.  You may receive a survey in the mail within a couple weeks.  Please take the time to complete it and return it.  Your input is ALWAYS important to us.  Thank you!  Nidhi Max Debbie, & Maurice  Your Care Transition team, [phone]

## 2023-12-13 NOTE — CARE PLAN
The patient's goals for the shift include   Pt will be discharged today.     The clinical goals for the shift include Pt will deny Chest pain throughout shift.    Patient discharged home with no home care needs. Discharge instructions were reviewed with patient. Pt verbally understood discharge instructions and had no questions or concerns. VS were stable at time of discharge and IV was discontinued and intact. No other complaints. Pt ambulated off floor to his ride.

## 2023-12-14 ENCOUNTER — PATIENT OUTREACH (OUTPATIENT)
Dept: PRIMARY CARE | Facility: CLINIC | Age: 68
End: 2023-12-14
Payer: MEDICARE

## 2023-12-14 NOTE — CONSULTS
Consults  History Of Present Illness:    Juanjo Alexis is a 68 y.o. male presenting with Acute chest pain and left flank discomfort.    The patient reports that he was taking shower and when he stepped out of the shower and went to the bathroom he was unsteady on his feet and lost his balance and boulders left flank muscle and simultaneously also experienced some discomfort in the lower retrosternal and upper subxiphoid area.    The discomfort was constant for a few days and prompted concern, came to the emergency department cardiac troponin negative WBC 13.7.  Glucose 130 hemoglobin 14 creatinine 1.22 liver enzymes normal magnesium 1.88    Blood pressure elevated 1 54-1 60 systolic diastolic 60 due to 68 mmHg.    Electrocardiogram showed sinus rhythm no changes of myocardial ischemia or injury    Cholesterol 112 LDL 66.    Old records reviewed, no cardiac studies done for over 10 years remote stress test reported negative.  Prior CT scan with mild coronary calcification.    Currently is resting in bed left flank area discomfort improved still has a bit of soreness in lower retrosternal and upper subxiphoid area.    Past Medical History:  He has a past medical history of Carpal tunnel syndrome (05/03/2023), Cataract (05/03/2023), Chronic obstructive pulmonary disease, unspecified (CMS/HCC) (01/30/2019), Chronic pancreatitis (CMS/[facility]) (05/03/2023), Gastritis (05/03/2023), Herniation of cervical intervertebral disc with radiculopathy (05/03/2023), Hypertension, Malignant carcinoid tumor of appendix (CMS/HCC) (05/03/2023), Neuralgia and neuritis, unspecified, Nontoxic single thyroid nodule, Other specified anxiety disorders, and Personal history of benign carcinoid tumor (12/16/2015).    Past Surgical History:  He has a past surgical history that includes Other surgical history (03/18/2013); Shoulder surgery (04/01/2013); Small intestine surgery (06/30/2017); Other surgical history (03/04/2021); and Colonoscopy  "(05/20/2013).      Social History:  He reports that he has been smoking cigarettes. He has a 40.00 pack-year smoking history. He has never used smokeless tobacco. He reports that he does not currently use alcohol. He reports that he does not use drugs.    Family History:  Family History   Problem Relation Name Age of Onset    Lung cancer Mother      Heart failure Father          Allergies:  Amoxicillin-pot clavulanate, Azithromycin, Codeine, Hydrochlorothiazide, and Ondansetron hcl    Outpatient Medications:  Current Outpatient Medications   Medication Instructions    albuterol 2.5 mg /3 mL (0.083 %) nebulizer solution USE 1 UNIT DOSE EVERY 4-6 HOURS AS NEEDED FOR WHEEZING .    amLODIPine (Norvasc) 10 mg tablet TAKE 1 TABLET BY MOUTH EVERY DAY    BD Integra Syringe 3 mL 25 gauge x 5/8\" syringe INJECT VITAMIN B12 ONCE MONTHLY    doxycycline (VIBRAMYCIN) 100 mg, oral, 2 times daily, Take with at least 8 ounces (large glass) of water, do not lie down for 30 minutes after    DULoxetine (Cymbalta) 30 mg DR capsule TAKE 1 CAPSULE BY MOUTH ONCE DAILY, TAKE WITH THE 60MG TO EQUAL 90MG    metoprolol tartrate (Lopressor) 50 mg tablet TAKE 1 TABLET BY MOUTH TWICE A DAY    pregabalin (LYRICA) 200 mg, oral, 2 times daily    QUEtiapine (SEROquel) 25 mg tablet TAKE 1 TABLET BY MOUTH TWICE A DAY    QUEtiapine (SEROQUEL) 50 mg, oral, Nightly    simvastatin (Zocor) 20 mg tablet TAKE 1 TABLET BY MOUTH EVERY DAY IN THE EVENING    Trelegy Ellipta 200-62.5-25 mcg blister with device 1 puff, inhalation, Daily         Inpatient Medications:        Last Labs:  Results for orders placed or performed during the hospital encounter of 12/12/23 (from the past 96 hour(s))   CBC and Auto Differential   Result Value Ref Range    WBC 13.7 (H) 4.4 - 11.3 x10*3/uL    nRBC 0.0 0.0 - 0.0 /100 WBCs    RBC 4.68 4.50 - 5.90 x10*6/uL    Hemoglobin 14.0 13.5 - 17.5 g/dL    Hematocrit 42.6 41.0 - 52.0 %    MCV 91 80 - 100 fL    MCH 29.9 26.0 - 34.0 pg    MCHC " 32.9 32.0 - 36.0 g/dL    RDW 14.7 (H) 11.5 - 14.5 %    Platelets 287 150 - 450 x10*3/uL    Neutrophils % 66.2 40.0 - 80.0 %    Immature Granulocytes %, Automated 0.6 0.0 - 0.9 %    Lymphocytes % 18.4 13.0 - 44.0 %    Monocytes % 8.9 2.0 - 10.0 %    Eosinophils % 5.0 0.0 - 6.0 %    Basophils % 0.9 0.0 - 2.0 %    Neutrophils Absolute 9.10 (H) 1.20 - 7.70 x10*3/uL    Immature Granulocytes Absolute, Automated 0.08 0.00 - 0.70 x10*3/uL    Lymphocytes Absolute 2.53 1.20 - 4.80 x10*3/uL    Monocytes Absolute 1.22 (H) 0.10 - 1.00 x10*3/uL    Eosinophils Absolute 0.69 0.00 - 0.70 x10*3/uL    Basophils Absolute 0.12 (H) 0.00 - 0.10 x10*3/uL   Comprehensive Metabolic Panel   Result Value Ref Range    Glucose 130 (H) 74 - 99 mg/dL    Sodium 139 136 - 145 mmol/L    Potassium 4.1 3.5 - 5.3 mmol/L    Chloride 105 98 - 107 mmol/L    Bicarbonate 27 21 - 32 mmol/L    Anion Gap 11 10 - 20 mmol/L    Urea Nitrogen 11 6 - 23 mg/dL    Creatinine 1.22 0.50 - 1.30 mg/dL    eGFR 65 >60 mL/min/1.73m*2    Calcium 8.6 8.6 - 10.3 mg/dL    Albumin 4.0 3.4 - 5.0 g/dL    Alkaline Phosphatase 96 33 - 136 U/L    Total Protein 6.8 6.4 - 8.2 g/dL    AST 12 9 - 39 U/L    Bilirubin, Total 0.4 0.0 - 1.2 mg/dL    ALT 10 10 - 52 U/L   Magnesium   Result Value Ref Range    Magnesium 1.88 1.60 - 2.40 mg/dL   Troponin I, High Sensitivity, Initial   Result Value Ref Range    Troponin I, High Sensitivity 5 0 - 20 ng/L   Troponin, High Sensitivity, 1 Hour   Result Value Ref Range    Troponin I, High Sensitivity 4 0 - 20 ng/L   CBC   Result Value Ref Range    WBC 11.4 (H) 4.4 - 11.3 x10*3/uL    nRBC      RBC 4.38 (L) 4.50 - 5.90 x10*6/uL    Hemoglobin 12.8 (L) 13.5 - 17.5 g/dL    Hematocrit 39.8 (L) 41.0 - 52.0 %    MCV 91 80 - 100 fL    MCH 29.2 26.0 - 34.0 pg    MCHC 32.2 32.0 - 36.0 g/dL    RDW 14.7 (H) 11.5 - 14.5 %    Platelets 253 150 - 450 x10*3/uL   Comprehensive metabolic panel   Result Value Ref Range    Glucose 99 74 - 99 mg/dL    Sodium 142 136 - 145  mmol/L    Potassium 4.0 3.5 - 5.3 mmol/L    Chloride 109 (H) 98 - 107 mmol/L    Bicarbonate 25 21 - 32 mmol/L    Anion Gap 12 10 - 20 mmol/L    Urea Nitrogen 9 6 - 23 mg/dL    Creatinine 1.01 0.50 - 1.30 mg/dL    eGFR 82 >60 mL/min/1.73m*2    Calcium 8.3 (L) 8.6 - 10.3 mg/dL    Albumin 3.7 3.4 - 5.0 g/dL    Alkaline Phosphatase 80 33 - 136 U/L    Total Protein 6.0 (L) 6.4 - 8.2 g/dL    AST 10 9 - 39 U/L    Bilirubin, Total 0.3 0.0 - 1.2 mg/dL    ALT 10 10 - 52 U/L   Lipid Panel   Result Value Ref Range    Cholesterol 112 0 - 199 mg/dL    HDL-Cholesterol 31.5 mg/dL    Cholesterol/HDL Ratio 3.6     LDL Calculated 66 <=99 mg/dL    VLDL 15 0 - 40 mg/dL    Triglycerides 73 0 - 149 mg/dL    Non HDL Cholesterol 81 0 - 149 mg/dL   ECG 12 lead   Result Value Ref Range    Ventricular Rate 69 BPM    Atrial Rate 69 BPM    TN Interval 196 ms    QRS Duration 136 ms    QT Interval 420 ms    QTC Calculation(Bazett) 450 ms    P Axis 100 degrees    R Axis 22 degrees    T Axis 41 degrees    QRS Count 11 beats    Q Onset 218 ms    P Onset 120 ms    P Offset 176 ms    T Offset 428 ms    QTC Fredericia 440 ms          Last Recorded Vitals:  Vitals:    12/13/23 0016 12/13/23 0727 12/13/23 1243 12/13/23 1654   BP: 154/63 160/68 154/62 153/65   BP Location: Left arm Right arm Right arm Left arm   Patient Position: Lying Sitting Lying Lying   Pulse: 75 60 65 64   Resp: 18 19 17 18   Temp: 36.8 °C (98.2 °F) 36.3 °C (97.3 °F) 37.2 °C (99 °F) 36.6 °C (97.9 °F)   TempSrc: Temporal Temporal Temporal Temporal   SpO2: 94% 96% 93%    Weight:       Height:         I/O last 3 completed shifts:  In: 300 (2.8 mL/kg) [P.O.:300]  Out: - (0 mL/kg)   Weight: 108.4 kg       Physical Exam:  Constitutional: Well developed, awake/alert/oriented x3, no distress, alert and cooperative  Eyes: PERRL, EOMI, clear sclera  ENMT: mucous membranes moist, no apparent injury, no lesions seen  Head/Neck: Neck supple, no apparent injury, thyroid without mass or tenderness,  No JVD, trachea midline, no bruits  Respiratory/Thorax: Severely diminished air entry, diffuse inspiratory and expiratory wheezes and rhonchi  Gastrointestinal: Nondistended, soft, non-tender, no rebound tenderness or guarding, no masses palpable, no organomegaly, +BS, no bruits  Musculoskeletal: ROM intact, no joint swelling, normal strength  Extremities: normal extremities, no cyanosis edema, contusions or wounds, no clubbing  Neurological: alert and oriented x3, intact senses, motor, response and reflexes, normal strength  Lymphatic: No significant lymphadenopathy  Psychological: Appropriate mood and behavior  Skin: Warm and dry, no lesions, no rashes     Assessment/Plan   Problem List Items Addressed This Visit          Gastrointestinal and Abdominal    Abdominal pain       Pulmonary and Pneumonias    Chronic obstructive pulmonary disease (CMS/HCC)    Relevant Medications    doxycycline (Vibramycin) 100 mg capsule     Other Visit Diagnoses       Chest pain, unspecified type    -  Primary         Patient has been admitted to the hospital and monitored on telemetry, maintaining sinus rhythm no arrhythmias.    Pulmonary exam abnormal, diffuse inspiratory and expiratory wheezes and rhonchi, history of ongoing tobacco use.  Advised to quit smoking on permanent basis, follow-up with pulmonary consultant, continue bronchodilators.    No current evidence of acute coronary syndrome but has risk factors for ischemic heart disease.  Advise outpatient evaluation with echocardiography and myocardial perfusion imaging.    Lipids at goal for current risk continue simvastatin.  Continue metoprolol for cardioprotection and amlodipine for hypertension.    Patient currently stable for discharge with close outpatient medical and cardiac follow-up.         Code Status:  Full Code    I spent 30 minutes in the professional and overall care of this patient.        Mark Jimenez MD

## 2023-12-14 NOTE — PROGRESS NOTES
Discharge Facility: [facility]  Discharge Diagnosis: Chest Pain   Admission Date: 12-  Discharge Date: 12-    PCP Appointment Date: 12-  Specialist Appointment Date:   -Pulmonary  -Cardiology -outpatient evaluation with echocardiography and myocardial perfusion imaging    Hospital Encounter and Summary: Linked   See discharge assessment below for further details  Engagement  Call Start Time: 1233 (12/14/2023 12:33 PM)    Medications  Medications reviewed with patient/caregiver?: Yes (12/14/2023 12:33 PM)  Is the patient having any side effects they believe may be caused by any medication additions or changes?: No (12/14/2023 12:33 PM)  Does the patient have all medications ordered at discharge?: Yes (12/14/2023 12:33 PM)  Prescription Comments: doxycycline 100 mg capsule  Commonly known as: Vibramycin  Take 1 capsule (100 mg) by mouth 2 times a day  for 7 days. Take with at least 8 ounces (large glass)  of water, do not lie down for 30 minutes after (12/14/2023 12:33 PM)  Is the patient taking all medications as directed (includes completed medication regime)?: Yes (12/14/2023 12:33 PM)  Care Management Interventions: Provided patient education (12/14/2023 12:33 PM)  Medication Comments: Denies any  questions or concerns about their medications once they are home.  Verbalizes an understanding regarding how to take their medications and which medications they should be taking. (12/14/2023 12:33 PM)    Appointments  Does the patient have a primary care provider?: Yes (12/14/2023 12:33 PM)  Care Management Interventions: Verified appointment date/time/provider (12/14/2023 12:33 PM)  Has the patient kept scheduled appointments due by today?: Yes (12/14/2023 12:33 PM)  Care Management Interventions: Educated on importance of keeping appointment (12/14/2023 12:33 PM)    Self Management  What is the home health agency?: NA (12/14/2023 12:33 PM)  Has home health visited the patient within  72 hours of discharge?: Not applicable (2023 12:33 PM)  What Durable Medical Equipment (DME) was ordered?: NA (2023 12:33 PM)    Patient Teaching  Does the patient have access to their discharge instructions?: Yes (2023 12:33 PM)  Care Management Interventions: Reviewed instructions with patient (2023 12:33 PM)  What is the patient's perception of their health status since discharge?: Improving (2023 12:33 PM)  Is the patient/caregiver able to teach back the hierarchy of who to call/visit for symptoms/problems? PCP, Specialist, Home Health nurse, Urgent Care, ED, 911: Yes (2023 12:33 PM)    Wrap Up  Wrap Up Additional Comments: Spoke w/ pt.   Pt identified by name and   Patient doing well.  Denies any pain or discomfort at this time. Denies fevers, chills, SOB or chest pain.  Reviewed discharge instructions, medications, and follow up.    Patient denies any further discharge questions/needs at this time.Emphasized the importance of hospital follow up.    Follow up is needed after discharge to review the hospital recommendations, assess your response to your  treatment and make further recommendations for your transition of care.    Please take all medications as prescribed and keep all follow-up appointments.   Contact your primary care physician with any questions or concerns that arise.     You may contact your outpatient specialists office for any questions regarding specifics relating to their recommendations.    Confirms they will attend the scheduled follow up appointment Aware of my availability for non emergent concerns    Requests referral to pulmonologist.  Will discuss with PCP at follow up. (2023 12:33 PM)  Call End Time: 1236 (2023 12:33 PM)

## 2023-12-15 ENCOUNTER — OFFICE VISIT (OUTPATIENT)
Dept: PRIMARY CARE | Facility: CLINIC | Age: 68
End: 2023-12-15
Payer: MEDICARE

## 2023-12-15 VITALS
BODY MASS INDEX: 32.62 KG/M2 | DIASTOLIC BLOOD PRESSURE: 74 MMHG | WEIGHT: 233 LBS | HEIGHT: 71 IN | TEMPERATURE: 97 F | SYSTOLIC BLOOD PRESSURE: 147 MMHG | HEART RATE: 67 BPM | OXYGEN SATURATION: 98 %

## 2023-12-15 DIAGNOSIS — E78.2 MIXED HYPERLIPIDEMIA: ICD-10-CM

## 2023-12-15 DIAGNOSIS — I10 ESSENTIAL HYPERTENSION: ICD-10-CM

## 2023-12-15 DIAGNOSIS — R07.9 CHEST PAIN AT REST: Primary | ICD-10-CM

## 2023-12-15 DIAGNOSIS — L72.9 INFECTED CYST OF SKIN: ICD-10-CM

## 2023-12-15 DIAGNOSIS — Z23 NEED FOR INFLUENZA VACCINATION: ICD-10-CM

## 2023-12-15 DIAGNOSIS — J44.9 CHRONIC OBSTRUCTIVE PULMONARY DISEASE, UNSPECIFIED COPD TYPE (MULTI): ICD-10-CM

## 2023-12-15 DIAGNOSIS — S39.012A STRAIN OF LUMBAR REGION, INITIAL ENCOUNTER: ICD-10-CM

## 2023-12-15 DIAGNOSIS — L08.9 INFECTED CYST OF SKIN: ICD-10-CM

## 2023-12-15 PROCEDURE — 96372 THER/PROPH/DIAG INJ SC/IM: CPT | Performed by: FAMILY MEDICINE

## 2023-12-15 PROCEDURE — 3078F DIAST BP <80 MM HG: CPT | Performed by: FAMILY MEDICINE

## 2023-12-15 PROCEDURE — G0008 ADMIN INFLUENZA VIRUS VAC: HCPCS | Performed by: FAMILY MEDICINE

## 2023-12-15 PROCEDURE — 3077F SYST BP >= 140 MM HG: CPT | Performed by: FAMILY MEDICINE

## 2023-12-15 PROCEDURE — 90662 IIV NO PRSV INCREASED AG IM: CPT | Performed by: FAMILY MEDICINE

## 2023-12-15 PROCEDURE — 1159F MED LIST DOCD IN RCRD: CPT | Performed by: FAMILY MEDICINE

## 2023-12-15 PROCEDURE — 4004F PT TOBACCO SCREEN RCVD TLK: CPT | Performed by: FAMILY MEDICINE

## 2023-12-15 PROCEDURE — 99496 TRANSJ CARE MGMT HIGH F2F 7D: CPT | Performed by: FAMILY MEDICINE

## 2023-12-15 PROCEDURE — 1126F AMNT PAIN NOTED NONE PRSNT: CPT | Performed by: FAMILY MEDICINE

## 2023-12-15 PROCEDURE — 1160F RVW MEDS BY RX/DR IN RCRD: CPT | Performed by: FAMILY MEDICINE

## 2023-12-15 PROCEDURE — 3008F BODY MASS INDEX DOCD: CPT | Performed by: FAMILY MEDICINE

## 2023-12-15 RX ORDER — MUPIROCIN 20 MG/G
OINTMENT TOPICAL 2 TIMES DAILY
Qty: 22 G | Refills: 0 | Status: SHIPPED | OUTPATIENT
Start: 2023-12-15 | End: 2023-12-25

## 2023-12-15 RX ORDER — KETOROLAC TROMETHAMINE 30 MG/ML
30 INJECTION, SOLUTION INTRAMUSCULAR; INTRAVENOUS ONCE
Status: COMPLETED | OUTPATIENT
Start: 2023-12-15 | End: 2023-12-15

## 2023-12-15 RX ORDER — LOSARTAN POTASSIUM 25 MG/1
25 TABLET ORAL DAILY
Qty: 30 TABLET | Refills: 3 | Status: SHIPPED | OUTPATIENT
Start: 2023-12-15 | End: 2024-02-16

## 2023-12-15 RX ADMIN — KETOROLAC TROMETHAMINE 30 MG: 30 INJECTION, SOLUTION INTRAMUSCULAR; INTRAVENOUS at 12:17

## 2023-12-15 ASSESSMENT — ENCOUNTER SYMPTOMS
LOSS OF SENSATION IN FEET: 0
OCCASIONAL FEELINGS OF UNSTEADINESS: 0
DEPRESSION: 0

## 2023-12-15 NOTE — PROGRESS NOTES
"Subjective   Patient ID: Juanjo Alexis is a 68 y.o. male who presents for Follow-up (Hospital discharge/Getting more cyst/Blood pressure).  HPI  TCM  Here for follow up of hospitalization 12/12-12/13 for chest pain. Troponins were elevated but were trending down upon discharge, as well had elevated WBC so was started on doxycycline which patient is taking. Going to be getting echo done.   No longer having chest pain, no sob or wheezing.   Has a small infected cyst on left scrotum  Patient also having low back pain since being in the hospital. No dysuria or urinary urgency. No trauma.   BP has been elevated, compliant with norvasc and metoprolol.     Review of Systems  General: no fever  Eyes: no blurry vision  ENT: no sore throat, no ear pain  Resp: see HPI  Cardio: see HPI  Abd: no nausea/vomiting  : no dysuria, no increased urinary frequency      /74   Pulse 67   Temp 36.1 °C (97 °F)   Ht 1.803 m (5' 11\")   Wt 106 kg (233 lb)   SpO2 98%   BMI 32.50 kg/m²       Objective   Physical Exam  Gen: NAD, alert  Head: normocephalic/atraumatic  Eyes: conjunctivae normal  Ears: canals clear bilaterally, TM normal   Nose: external nose normal   Oropharynx: clear   Resp: Clear to auscultation  CVS: Regular rate and rhythm  Abdomen: soft, NT, ND  Ext: no edema, NT of lower extremities  Small cyst on left scrotum, tender to palpation, erythematous  Neuro: gait normal     Assessment/Plan   Problem List Items Addressed This Visit       Chronic obstructive pulmonary disease (CMS/HCC)    Relevant Orders    Referral to Pulmonology    Essential hypertension    Relevant Medications    losartan (Cozaar) 25 mg tablet    Hyperlipidemia    Relevant Orders    CT cardiac scoring wo IV contrast    Chest pain at rest - primary  Continue follow up with cardiology     Other Visit Diagnoses       Infected cyst of skin        Relevant Medications    mupirocin (Bactroban) 2 % ointment    Need for influenza vaccination        Relevant " Orders    Flu vaccine, quadrivalent, high-dose, preservative free, age 65y+ (FLUZONE) (Completed)    Strain of lumbar region, initial encounter        Relevant Medications    ketorolac (Toradol) injection 30 mg (Completed)

## 2023-12-19 ENCOUNTER — PATIENT OUTREACH (OUTPATIENT)
Dept: PRIMARY CARE | Facility: CLINIC | Age: 68
End: 2023-12-19
Payer: MEDICARE

## 2023-12-19 NOTE — PROGRESS NOTES
Unable to reach patient for call back after patient's follow up appointment with PCP. ( 12-)  LVM with call back number for patient to call if needed

## 2023-12-21 ENCOUNTER — APPOINTMENT (OUTPATIENT)
Dept: RADIOLOGY | Facility: HOSPITAL | Age: 68
End: 2023-12-21
Payer: MEDICARE

## 2023-12-22 RX ORDER — TRIAMCINOLONE ACETONIDE 40 MG/ML
40 INJECTION, SUSPENSION INTRA-ARTICULAR; INTRAMUSCULAR ONCE
Status: COMPLETED | OUTPATIENT
Start: 2023-12-22 | End: 2023-12-22

## 2023-12-22 RX ADMIN — TRIAMCINOLONE ACETONIDE 40 MG: 40 INJECTION, SUSPENSION INTRA-ARTICULAR; INTRAMUSCULAR at 14:07

## 2023-12-29 ENCOUNTER — APPOINTMENT (OUTPATIENT)
Dept: PRIMARY CARE | Facility: CLINIC | Age: 68
End: 2023-12-29
Payer: MEDICARE

## 2023-12-29 DIAGNOSIS — J44.9 CHRONIC OBSTRUCTIVE PULMONARY DISEASE, UNSPECIFIED (MULTI): ICD-10-CM

## 2024-01-02 RX ORDER — FLUTICASONE FUROATE, UMECLIDINIUM BROMIDE AND VILANTEROL TRIFENATATE 200; 62.5; 25 UG/1; UG/1; UG/1
1 POWDER RESPIRATORY (INHALATION) DAILY
Qty: 60 EACH | Refills: 5 | Status: SHIPPED | OUTPATIENT
Start: 2024-01-02 | End: 2024-01-09 | Stop reason: SDUPTHER

## 2024-01-03 DIAGNOSIS — R21 RASH AND OTHER NONSPECIFIC SKIN ERUPTION: ICD-10-CM

## 2024-01-03 DIAGNOSIS — F32.1 MODERATE MAJOR DEPRESSION (MULTI): ICD-10-CM

## 2024-01-04 RX ORDER — CLOTRIMAZOLE AND BETAMETHASONE DIPROPIONATE 10; .64 MG/G; MG/G
CREAM TOPICAL
Qty: 45 G | Refills: 1 | Status: SHIPPED | OUTPATIENT
Start: 2024-01-04 | End: 2024-03-12

## 2024-01-04 RX ORDER — DULOXETIN HYDROCHLORIDE 60 MG/1
CAPSULE, DELAYED RELEASE ORAL
Qty: 90 CAPSULE | Refills: 1 | Status: SHIPPED | OUTPATIENT
Start: 2024-01-04 | End: 2024-04-29

## 2024-01-09 ENCOUNTER — OFFICE VISIT (OUTPATIENT)
Dept: PULMONOLOGY | Facility: CLINIC | Age: 69
End: 2024-01-09
Payer: MEDICARE

## 2024-01-09 VITALS
HEIGHT: 71 IN | BODY MASS INDEX: 33.32 KG/M2 | WEIGHT: 238 LBS | DIASTOLIC BLOOD PRESSURE: 78 MMHG | SYSTOLIC BLOOD PRESSURE: 144 MMHG | HEART RATE: 64 BPM | OXYGEN SATURATION: 93 %

## 2024-01-09 DIAGNOSIS — J44.9 CHRONIC OBSTRUCTIVE PULMONARY DISEASE, UNSPECIFIED (MULTI): ICD-10-CM

## 2024-01-09 DIAGNOSIS — F17.210 CIGARETTE NICOTINE DEPENDENCE WITHOUT COMPLICATION: Primary | ICD-10-CM

## 2024-01-09 PROCEDURE — 1160F RVW MEDS BY RX/DR IN RCRD: CPT | Performed by: PEDIATRICS

## 2024-01-09 PROCEDURE — 1126F AMNT PAIN NOTED NONE PRSNT: CPT | Performed by: PEDIATRICS

## 2024-01-09 PROCEDURE — 3008F BODY MASS INDEX DOCD: CPT | Performed by: PEDIATRICS

## 2024-01-09 PROCEDURE — 99215 OFFICE O/P EST HI 40 MIN: CPT | Performed by: PEDIATRICS

## 2024-01-09 PROCEDURE — 3078F DIAST BP <80 MM HG: CPT | Performed by: PEDIATRICS

## 2024-01-09 PROCEDURE — 3077F SYST BP >= 140 MM HG: CPT | Performed by: PEDIATRICS

## 2024-01-09 PROCEDURE — 1159F MED LIST DOCD IN RCRD: CPT | Performed by: PEDIATRICS

## 2024-01-09 RX ORDER — FLUTICASONE FUROATE, UMECLIDINIUM BROMIDE AND VILANTEROL TRIFENATATE 200; 62.5; 25 UG/1; UG/1; UG/1
1 POWDER RESPIRATORY (INHALATION) DAILY
Qty: 60 EACH | Refills: 11 | Status: SHIPPED | OUTPATIENT
Start: 2024-01-09

## 2024-01-09 NOTE — PROGRESS NOTES
"Subjective   Patient ID: Juanjo Alexis is a 68 y.o. male who presents for COPD SUNITHA lung nodule    HPI    Patient initially evaluated in clinic on 03/22/2017. Initial clinic visit note:   \"At baseline, he has dyspnea on exertion, but none at rest. His symptoms started many years ago. His dyspnea has been stable over the last several months. He currently sits for most of the day, and does not carry loads and do strenuous exercise. He is short of breath when hurrying on level ground or walking up a slight hill (mMRC 1). His CAT score is 19. He also denies orthopnea, pnd, or liz. His weight has been stable. He also relates a chronic cough, with clear sputum, with occasional wheezing but no fever or shivering chills. He denies any hemoptysis or chest pain.\"     05/03/2017:On Monday 05/01, he started having increased shortness of breath, cough with green sputum (no hemoptysis) and a febrile episode at 101.5. A sputum sample was sent w Hemophilus influenza multisensitive. He completed a course of Levaquin and prednisone.      6/14/2017: Early june he started complaining of worsening cough and sputum and went to the ED when he got a course of abx and prednisone. Today his breathing is at baseline. He has been active outdoors with minimal limitations. His CAT is 23. He continues to smoke (1.5PPD). He did relate a runny nose and tingling sensation in the back of his throat that makes him cough.     10/11/2017: Since the last visit, he couldn't get coverage for any LAMA inhalers. Still on Symbicort and prn albuterol. They lost electricity at home 2/2 bills. He is not able to use his CPAP or nebulizers. He presented to the ED with right sided chest and shoulder pain. No ACS. Today, breathing is at baseline. Denies any increased cough, sputum, change in the characteristic of his sputum. No f/c/ns. CAT of 22. Patient has been compliant with inhalers (symbicort), using rescue inhaler everyday. ESS 10     07/09/2018: Since the " last visit, he has not needed an abx or prednisone. He moved in with his daughter, is back on his CPAP. Still smoking. Uses his symbicort and prn abluterol. Does not use his nebulizer. He has noticed over the last week increase in his cough, and sputum, feels more congested. No f/c/ns.     01/28/2019 -> At his last visit, he completed a course of prednisone and antibiotics for AECOPD and his breathing returned to baseline. Denies any new cough, sputum, f/c/ns today. Compliant with his symbicort and only using his rescue ProAir every few days. Also compliant with his CPAP machine (continues to live with his daughter). He got a membership to the [employer] but have been inconsistent. Continues to smoke 2 PPD. Had CT chest, PFTs and 6MWT done (results below).      1/5/2022: Mr Alexis is doing ok. He was seen in the ER in early December with flu-like illness which he is recovered from. He is feeling at baseline. He is past due for low dose lung screening. He is not interested in any treatment for SUNITHA at this time. He is using incruse and symbicort every day with albuterol as needed     10/5/2022: Mr Alexis had a COPD exacerbation he was treated with prednisone and doxycycline and is feeling better now. He ran out of trelegy for a couple of days but got a refill from PCP.      2/1/2023: Mr Alexis is about the same. he has good days and bad days. He feels he is deconditioned due to inactivity. He is using trelegy and albuterol (1-2 times a day). Stairs are his biggest issue.      1/9/2024:  Mr Alexis is about the same, he is using trelegy and albuterol.  He had a chest CT recently that showed no nodules, mild-moderate emphysema  He is still having issues with sleep apnea but he states he cannot tolerate PAP therapy I offered an appointment with our sleep specialist.  He will think about it and call if he wants to see her.  He did PFT in April which shows moderate obstruction, air trapping and bronchodilator response.          Previous pulmonary history:   He has no history of recurrent infections, or lung disease as a child. He was previously diagnosed with COPD many years ago. He currently is on no supplemental oxygen.  He current inhaler regimen is Symbicort and prn albuterol. He has never been to pulmonary rehab.     Hospitalization History:  He has not been hospitalized over the last year.     Sleep history:  Is known to have sleep apnea. Compliant with his CPAP.  [place] score (2016) is 8.  [place] score (2016) is 5.     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  Was also in the [employer].     Family History:  No family history of lung diseases or cancer.     Imaging history: (I have personally reviewed the imaging below)  2019-> no new nodules  2017 -> CT scan with resolution of lung nodule. No new findings  2017- >CT chest with paraseptal and centrilobular mostly in the upper lobes. 6mm nodule in the RUL (new since ) and 4 mm in the LLL     PFTs:   2019-> Ratio of 0.59/FEV1 2.85L (78%) (no BD response)/FVC 4.83L (99%)/ %/RVtoTLC ratio 0.44/DLCO 69%   FEV1 of 76% with + BD response     6 MWTs:   2019->on RA, 350 m without desaturation. [place] SpO2 of 99%.   306 meters with no desaturation     Lung biopsy: None on record     Echo:   2016 -> Normal EF, RV size and function     Labs:  no anemia, or eosinophilia  Bicarb is 24    Review of Systems    See scanned documents attached to this note for review of systems, and appropriate scales/scores for this visit.     Objective   Physical Exam  Constitutional:       Appearance: Normal appearance.   HENT:      Head: Normocephalic and atraumatic.      Mouth/Throat:       Pharynx: Oropharynx is clear.   Cardiovascular:      Rate and Rhythm: Normal rate and regular rhythm.      Pulses: Normal pulses.      Heart sounds: Normal heart sounds.   Pulmonary:      Effort: Pulmonary effort is normal.      Breath sounds: Normal breath sounds. No wheezing, rhonchi or rales.   Abdominal:      General: Bowel sounds are normal.      Palpations: Abdomen is soft.   Musculoskeletal:         General: Normal range of motion.   Skin:     General: Skin is warm and dry.   Neurological:      General: No focal deficit present.      Mental Status: He is alert and oriented to person, place, and time.   Psychiatric:         Mood and Affect: Mood normal.             Assessment/Plan      Mr. Alexis is a 68 y.o man, smoker (1-2 PPD for years), being evaluated for COPD.     #COPD:  - continue trelegy   - albuterol as needed  - encouraged walking/exercise     #Lung nodules: had CT Dec 2023  - LDCT on/after Dec 13 2024     #SUNITHA: not interested in therapy at this time     # Smoking cessation:  - encouraged cessation        Follow up Dec 2024 after LDCT    Luis M Damon MD 01/09/24 8:57 AM

## 2024-01-10 ENCOUNTER — APPOINTMENT (OUTPATIENT)
Dept: RADIOLOGY | Facility: HOSPITAL | Age: 69
End: 2024-01-10
Payer: MEDICARE

## 2024-01-11 ENCOUNTER — PATIENT OUTREACH (OUTPATIENT)
Dept: PRIMARY CARE | Facility: CLINIC | Age: 69
End: 2024-01-11
Payer: MEDICARE

## 2024-01-13 DIAGNOSIS — J44.9 CHRONIC OBSTRUCTIVE PULMONARY DISEASE, UNSPECIFIED COPD TYPE (MULTI): Primary | ICD-10-CM

## 2024-02-10 ENCOUNTER — HOSPITAL ENCOUNTER (OUTPATIENT)
Dept: CARDIOLOGY | Facility: HOSPITAL | Age: 69
Discharge: HOME | End: 2024-02-10
Payer: MEDICARE

## 2024-02-10 ENCOUNTER — HOSPITAL ENCOUNTER (EMERGENCY)
Facility: HOSPITAL | Age: 69
Discharge: HOME | End: 2024-02-10
Attending: STUDENT IN AN ORGANIZED HEALTH CARE EDUCATION/TRAINING PROGRAM
Payer: MEDICARE

## 2024-02-10 ENCOUNTER — APPOINTMENT (OUTPATIENT)
Dept: RADIOLOGY | Facility: HOSPITAL | Age: 69
End: 2024-02-10
Payer: MEDICARE

## 2024-02-10 VITALS
WEIGHT: 230 LBS | BODY MASS INDEX: 32.2 KG/M2 | HEART RATE: 70 BPM | OXYGEN SATURATION: 98 % | TEMPERATURE: 97.8 F | RESPIRATION RATE: 18 BRPM | HEIGHT: 71 IN | DIASTOLIC BLOOD PRESSURE: 71 MMHG | SYSTOLIC BLOOD PRESSURE: 145 MMHG

## 2024-02-10 DIAGNOSIS — R07.89 CHEST WALL PAIN: Primary | ICD-10-CM

## 2024-02-10 LAB
ALBUMIN SERPL BCP-MCNC: 4 G/DL (ref 3.4–5)
ALP SERPL-CCNC: 88 U/L (ref 33–136)
ALT SERPL W P-5'-P-CCNC: 11 U/L (ref 10–52)
ANION GAP SERPL CALC-SCNC: 11 MMOL/L (ref 10–20)
AST SERPL W P-5'-P-CCNC: 13 U/L (ref 9–39)
BASOPHILS # BLD AUTO: 0.15 X10*3/UL (ref 0–0.1)
BASOPHILS NFR BLD AUTO: 1.2 %
BILIRUB SERPL-MCNC: 0.4 MG/DL (ref 0–1.2)
BNP SERPL-MCNC: 51 PG/ML (ref 0–99)
BUN SERPL-MCNC: 10 MG/DL (ref 6–23)
CALCIUM SERPL-MCNC: 9 MG/DL (ref 8.6–10.3)
CARDIAC TROPONIN I PNL SERPL HS: 4 NG/L (ref 0–20)
CARDIAC TROPONIN I PNL SERPL HS: 4 NG/L (ref 0–20)
CHLORIDE SERPL-SCNC: 103 MMOL/L (ref 98–107)
CO2 SERPL-SCNC: 28 MMOL/L (ref 21–32)
CREAT SERPL-MCNC: 1.3 MG/DL (ref 0.5–1.3)
EGFRCR SERPLBLD CKD-EPI 2021: 60 ML/MIN/1.73M*2
EOSINOPHIL # BLD AUTO: 0.51 X10*3/UL (ref 0–0.7)
EOSINOPHIL NFR BLD AUTO: 3.9 %
ERYTHROCYTE [DISTWIDTH] IN BLOOD BY AUTOMATED COUNT: 14.2 % (ref 11.5–14.5)
FLUAV RNA RESP QL NAA+PROBE: NOT DETECTED
FLUBV RNA RESP QL NAA+PROBE: NOT DETECTED
GLUCOSE SERPL-MCNC: 146 MG/DL (ref 74–99)
HCT VFR BLD AUTO: 43 % (ref 41–52)
HGB BLD-MCNC: 14.2 G/DL (ref 13.5–17.5)
IMM GRANULOCYTES # BLD AUTO: 0.07 X10*3/UL (ref 0–0.7)
IMM GRANULOCYTES NFR BLD AUTO: 0.5 % (ref 0–0.9)
LACTATE SERPL-SCNC: 1.9 MMOL/L (ref 0.4–2)
LIPASE SERPL-CCNC: 22 U/L (ref 9–82)
LYMPHOCYTES # BLD AUTO: 2.28 X10*3/UL (ref 1.2–4.8)
LYMPHOCYTES NFR BLD AUTO: 17.5 %
MAGNESIUM SERPL-MCNC: 2.05 MG/DL (ref 1.6–2.4)
MCH RBC QN AUTO: 29.4 PG (ref 26–34)
MCHC RBC AUTO-ENTMCNC: 33 G/DL (ref 32–36)
MCV RBC AUTO: 89 FL (ref 80–100)
MONOCYTES # BLD AUTO: 1.04 X10*3/UL (ref 0.1–1)
MONOCYTES NFR BLD AUTO: 8 %
NEUTROPHILS # BLD AUTO: 8.96 X10*3/UL (ref 1.2–7.7)
NEUTROPHILS NFR BLD AUTO: 68.9 %
NRBC BLD-RTO: 0 /100 WBCS (ref 0–0)
PLATELET # BLD AUTO: 312 X10*3/UL (ref 150–450)
POTASSIUM SERPL-SCNC: 3.5 MMOL/L (ref 3.5–5.3)
PROT SERPL-MCNC: 6.9 G/DL (ref 6.4–8.2)
RBC # BLD AUTO: 4.83 X10*6/UL (ref 4.5–5.9)
SARS-COV-2 RNA RESP QL NAA+PROBE: NOT DETECTED
SODIUM SERPL-SCNC: 138 MMOL/L (ref 136–145)
WBC # BLD AUTO: 13 X10*3/UL (ref 4.4–11.3)

## 2024-02-10 PROCEDURE — 2550000001 HC RX 255 CONTRASTS: Mod: SE | Performed by: STUDENT IN AN ORGANIZED HEALTH CARE EDUCATION/TRAINING PROGRAM

## 2024-02-10 PROCEDURE — 96375 TX/PRO/DX INJ NEW DRUG ADDON: CPT | Mod: 59

## 2024-02-10 PROCEDURE — 83690 ASSAY OF LIPASE: CPT | Performed by: STUDENT IN AN ORGANIZED HEALTH CARE EDUCATION/TRAINING PROGRAM

## 2024-02-10 PROCEDURE — 96374 THER/PROPH/DIAG INJ IV PUSH: CPT | Mod: 59

## 2024-02-10 PROCEDURE — 36415 COLL VENOUS BLD VENIPUNCTURE: CPT | Performed by: STUDENT IN AN ORGANIZED HEALTH CARE EDUCATION/TRAINING PROGRAM

## 2024-02-10 PROCEDURE — 84484 ASSAY OF TROPONIN QUANT: CPT | Performed by: STUDENT IN AN ORGANIZED HEALTH CARE EDUCATION/TRAINING PROGRAM

## 2024-02-10 PROCEDURE — 93005 ELECTROCARDIOGRAM TRACING: CPT

## 2024-02-10 PROCEDURE — 83735 ASSAY OF MAGNESIUM: CPT | Performed by: STUDENT IN AN ORGANIZED HEALTH CARE EDUCATION/TRAINING PROGRAM

## 2024-02-10 PROCEDURE — 99284 EMERGENCY DEPT VISIT MOD MDM: CPT | Mod: 25 | Performed by: STUDENT IN AN ORGANIZED HEALTH CARE EDUCATION/TRAINING PROGRAM

## 2024-02-10 PROCEDURE — 93005 ELECTROCARDIOGRAM TRACING: CPT | Mod: 76

## 2024-02-10 PROCEDURE — 74177 CT ABD & PELVIS W/CONTRAST: CPT

## 2024-02-10 PROCEDURE — 83605 ASSAY OF LACTIC ACID: CPT | Performed by: STUDENT IN AN ORGANIZED HEALTH CARE EDUCATION/TRAINING PROGRAM

## 2024-02-10 PROCEDURE — 85025 COMPLETE CBC W/AUTO DIFF WBC: CPT | Performed by: STUDENT IN AN ORGANIZED HEALTH CARE EDUCATION/TRAINING PROGRAM

## 2024-02-10 PROCEDURE — 74177 CT ABD & PELVIS W/CONTRAST: CPT | Mod: FOREIGN READ | Performed by: RADIOLOGY

## 2024-02-10 PROCEDURE — 87636 SARSCOV2 & INF A&B AMP PRB: CPT | Performed by: STUDENT IN AN ORGANIZED HEALTH CARE EDUCATION/TRAINING PROGRAM

## 2024-02-10 PROCEDURE — 80053 COMPREHEN METABOLIC PANEL: CPT | Performed by: STUDENT IN AN ORGANIZED HEALTH CARE EDUCATION/TRAINING PROGRAM

## 2024-02-10 PROCEDURE — 2500000004 HC RX 250 GENERAL PHARMACY W/ HCPCS (ALT 636 FOR OP/ED): Mod: SE | Performed by: STUDENT IN AN ORGANIZED HEALTH CARE EDUCATION/TRAINING PROGRAM

## 2024-02-10 PROCEDURE — 83880 ASSAY OF NATRIURETIC PEPTIDE: CPT | Performed by: STUDENT IN AN ORGANIZED HEALTH CARE EDUCATION/TRAINING PROGRAM

## 2024-02-10 PROCEDURE — 71260 CT THORAX DX C+: CPT | Mod: FOREIGN READ | Performed by: RADIOLOGY

## 2024-02-10 RX ORDER — KETOROLAC TROMETHAMINE 15 MG/ML
15 INJECTION, SOLUTION INTRAMUSCULAR; INTRAVENOUS ONCE
Status: COMPLETED | OUTPATIENT
Start: 2024-02-10 | End: 2024-02-10

## 2024-02-10 RX ADMIN — KETOROLAC TROMETHAMINE 15 MG: 15 INJECTION INTRAMUSCULAR; INTRAVENOUS at 20:47

## 2024-02-10 RX ADMIN — HYDROMORPHONE HYDROCHLORIDE 0.5 MG: 1 INJECTION, SOLUTION INTRAMUSCULAR; INTRAVENOUS; SUBCUTANEOUS at 21:27

## 2024-02-10 RX ADMIN — IOHEXOL 75 ML: 350 INJECTION, SOLUTION INTRAVENOUS at 21:08

## 2024-02-10 ASSESSMENT — PAIN DESCRIPTION - DESCRIPTORS
DESCRIPTORS: ACHING;STABBING
DESCRIPTORS: STABBING

## 2024-02-10 ASSESSMENT — PAIN DESCRIPTION - LOCATION
LOCATION: CHEST

## 2024-02-10 ASSESSMENT — PAIN SCALES - GENERAL
PAINLEVEL_OUTOF10: 6
PAINLEVEL_OUTOF10: 7
PAINLEVEL_OUTOF10: 8
PAINLEVEL_OUTOF10: 8

## 2024-02-10 ASSESSMENT — PAIN DESCRIPTION - PAIN TYPE: TYPE: ACUTE PAIN

## 2024-02-10 ASSESSMENT — PAIN - FUNCTIONAL ASSESSMENT
PAIN_FUNCTIONAL_ASSESSMENT: 0-10

## 2024-02-10 ASSESSMENT — PAIN DESCRIPTION - ORIENTATION
ORIENTATION: MID
ORIENTATION: MID

## 2024-02-10 ASSESSMENT — PAIN DESCRIPTION - FREQUENCY: FREQUENCY: CONSTANT/CONTINUOUS

## 2024-02-10 ASSESSMENT — PAIN DESCRIPTION - ONSET: ONSET: AWAKENED FROM SLEEP

## 2024-02-11 NOTE — DISCHARGE INSTRUCTIONS
You can take tylenol and ibuprofen for pain.  Alternate the medications so you are taking a medication every 3 hours.  When you get home take a tylenol, 3 hours later take an ibuprofen, 3 hours later tylenol and continue this pattern for the next 3-5 days

## 2024-02-13 LAB
ATRIAL RATE: 74 BPM
P AXIS: 86 DEGREES
P OFFSET: 170 MS
P ONSET: 132 MS
PR INTERVAL: 184 MS
Q ONSET: 224 MS
QRS COUNT: 12 BEATS
QRS DURATION: 120 MS
QT INTERVAL: 414 MS
QTC CALCULATION(BAZETT): 459 MS
QTC FREDERICIA: 444 MS
R AXIS: 28 DEGREES
T AXIS: 45 DEGREES
T OFFSET: 431 MS
VENTRICULAR RATE: 74 BPM

## 2024-02-15 ENCOUNTER — OFFICE VISIT (OUTPATIENT)
Dept: PRIMARY CARE | Facility: CLINIC | Age: 69
End: 2024-02-15
Payer: MEDICARE

## 2024-02-15 VITALS
SYSTOLIC BLOOD PRESSURE: 130 MMHG | OXYGEN SATURATION: 95 % | WEIGHT: 231.4 LBS | DIASTOLIC BLOOD PRESSURE: 70 MMHG | TEMPERATURE: 98.1 F | HEART RATE: 78 BPM | BODY MASS INDEX: 32.27 KG/M2

## 2024-02-15 DIAGNOSIS — E53.8 DEFICIENCY OF OTHER SPECIFIED B GROUP VITAMINS: ICD-10-CM

## 2024-02-15 DIAGNOSIS — T14.8XXA MUSCLE STRAIN: ICD-10-CM

## 2024-02-15 DIAGNOSIS — I48.91 ATRIAL FIBRILLATION, UNSPECIFIED TYPE (MULTI): ICD-10-CM

## 2024-02-15 DIAGNOSIS — I10 ESSENTIAL HYPERTENSION: ICD-10-CM

## 2024-02-15 DIAGNOSIS — Z79.899 CONTROLLED SUBSTANCE AGREEMENT SIGNED: Primary | ICD-10-CM

## 2024-02-15 DIAGNOSIS — F41.8 OTHER SPECIFIED ANXIETY DISORDERS: ICD-10-CM

## 2024-02-15 DIAGNOSIS — E78.5 HYPERLIPIDEMIA, UNSPECIFIED: ICD-10-CM

## 2024-02-15 DIAGNOSIS — F31.9 BIPOLAR AFFECTIVE DISORDER, REMISSION STATUS UNSPECIFIED (MULTI): ICD-10-CM

## 2024-02-15 PROCEDURE — 80365 DRUG SCREENING OXYCODONE: CPT

## 2024-02-15 PROCEDURE — 1159F MED LIST DOCD IN RCRD: CPT | Performed by: FAMILY MEDICINE

## 2024-02-15 PROCEDURE — 1124F ACP DISCUSS-NO DSCNMKR DOCD: CPT | Performed by: FAMILY MEDICINE

## 2024-02-15 PROCEDURE — 99213 OFFICE O/P EST LOW 20 MIN: CPT | Performed by: FAMILY MEDICINE

## 2024-02-15 PROCEDURE — 80373 DRUG SCREENING TRAMADOL: CPT

## 2024-02-15 PROCEDURE — 80345 DRUG SCREENING BARBITURATES: CPT

## 2024-02-15 PROCEDURE — 1160F RVW MEDS BY RX/DR IN RCRD: CPT | Performed by: FAMILY MEDICINE

## 2024-02-15 PROCEDURE — 80358 DRUG SCREENING METHADONE: CPT

## 2024-02-15 PROCEDURE — 80354 DRUG SCREENING FENTANYL: CPT

## 2024-02-15 PROCEDURE — 80346 BENZODIAZEPINES1-12: CPT

## 2024-02-15 PROCEDURE — 3075F SYST BP GE 130 - 139MM HG: CPT | Performed by: FAMILY MEDICINE

## 2024-02-15 PROCEDURE — 82570 ASSAY OF URINE CREATININE: CPT

## 2024-02-15 PROCEDURE — 3008F BODY MASS INDEX DOCD: CPT | Performed by: FAMILY MEDICINE

## 2024-02-15 PROCEDURE — 80307 DRUG TEST PRSMV CHEM ANLYZR: CPT

## 2024-02-15 PROCEDURE — 1125F AMNT PAIN NOTED PAIN PRSNT: CPT | Performed by: FAMILY MEDICINE

## 2024-02-15 PROCEDURE — 3078F DIAST BP <80 MM HG: CPT | Performed by: FAMILY MEDICINE

## 2024-02-15 PROCEDURE — 80361 OPIATES 1 OR MORE: CPT

## 2024-02-15 PROCEDURE — 80368 SEDATIVE HYPNOTICS: CPT

## 2024-02-15 RX ORDER — KETOROLAC TROMETHAMINE 10 MG/1
10 TABLET, FILM COATED ORAL EVERY 8 HOURS PRN
Qty: 15 TABLET | Refills: 0 | Status: SHIPPED | OUTPATIENT
Start: 2024-02-15 | End: 2024-02-20

## 2024-02-15 NOTE — PROGRESS NOTES
Subjective   Patient ID: Juanjo Alexis is a 68 y.o. male who presents for Follow-up (1 month/Was in ED for pain around chest muscular ).  HPI  Here for follow up of ED visit on 2/10 for chest pain, was putting tarp on the truck and then started feeling severe pain across his chest. Cardiac work up negative, as well as CT chest. Still having pain.  Has A-fib, under control, no palipations  Bipolar disorder and depression under control with meds.       Review of Systems  General: no fever  Eyes: no blurry vision  ENT: no sore throat, no ear pain  Resp: no cough, sob or wheezing  Cardio: no chest pain, no palpitations  Abd: no nausea/vomiting  : no dysuria, no increased urinary frequency      /70   Pulse 78   Temp 36.7 °C (98.1 °F)   Wt 105 kg (231 lb 6.4 oz)   SpO2 95%   BMI 32.27 kg/m²       Objective   Physical Exam  Gen: NAD, alert  Head: normocephalic/atraumatic  Eyes: conjunctivae normal  Ears: canals clear bilaterally, TM normal   Nose: external nose normal   Oropharynx: clear   Resp: Clear to auscultation  CVS: Regular rate and rhythm  Abdomen: soft, NT, ND  Ext: no edema, NT of lower extremities  Neuro: gait normal     Assessment/Plan   Problem List Items Addressed This Visit       Atrial fibrillation (CMS/HCC)  Under control     Bipolar affective disorder, remission status unspecified (CMS/HCC)  stable     Other Visit Diagnoses       Controlled substance agreement signed    -  Primary    Relevant Orders    Opiate/Opioid/Benzo Prescription Compliance    OOB Internal Tracking    Muscle strain        Relevant Medications    ketorolac (Toradol) 10 mg tablet

## 2024-02-16 LAB
AMPHETAMINES UR QL SCN: ABNORMAL
BARBITURATES UR QL SCN: ABNORMAL
BZE UR QL SCN: ABNORMAL
CANNABINOIDS UR QL SCN: ABNORMAL
CREAT UR-MCNC: 205.3 MG/DL (ref 20–370)
PCP UR QL SCN: ABNORMAL

## 2024-02-16 RX ORDER — QUETIAPINE FUMARATE 25 MG/1
TABLET, FILM COATED ORAL
Qty: 180 TABLET | Refills: 3 | Status: SHIPPED | OUTPATIENT
Start: 2024-02-16

## 2024-02-16 RX ORDER — LOSARTAN POTASSIUM 25 MG/1
25 TABLET ORAL DAILY
Qty: 90 TABLET | Refills: 1 | Status: SHIPPED | OUTPATIENT
Start: 2024-02-16

## 2024-02-16 RX ORDER — SIMVASTATIN 20 MG/1
TABLET, FILM COATED ORAL
Qty: 90 TABLET | Refills: 3 | Status: SHIPPED | OUTPATIENT
Start: 2024-02-16

## 2024-02-16 RX ORDER — CYANOCOBALAMIN 1000 UG/ML
INJECTION, SOLUTION INTRAMUSCULAR; SUBCUTANEOUS
Qty: 3 ML | Refills: 3 | Status: SHIPPED | OUTPATIENT
Start: 2024-02-16

## 2024-02-21 LAB
BUTALBITAL, URN, QUANT: 244 NG/ML
PENTOBARBITAL, URN, QUANT: <50 NG/ML
PHENOBARBITAL, URN, QUANT: <50 NG/ML

## 2024-02-21 NOTE — ED PROVIDER NOTES
Chief Complaint: Chest pain  HPI: This is a 68-year-old male, past medical history significant for COPD, chronic pancreatitis, malignant carcinoid tumor of the appendix, depression and anxiety, presenting to the emergency department for evaluation of chest pain that woke him up from sleep.  He states that it feels like a tight band around his chest with occasional stabbing pain.  He also complains of some shortness of breath on exertion.  He denies any fevers, chills, cough, congestion.  He denies any nausea or vomiting.    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    Neuralgia and neuritis, unspecified     Neuritis    Nontoxic single thyroid nodule     Solitary thyroid nodule    Other specified anxiety disorders     Depression with anxiety    Personal history of benign carcinoid tumor 12/16/2015    History of benign carcinoid tumor      Past Surgical History:   Procedure Laterality Date    COLONOSCOPY  05/20/2013    Complete Colonoscopy    OTHER SURGICAL HISTORY  03/18/2013    Excision Of Burn Tissue Shoulder    OTHER SURGICAL HISTORY  03/04/2021    Pancreatic surgery    SHOULDER SURGERY  04/01/2013    Shoulder Surgery    SMALL INTESTINE SURGERY  06/30/2017    Small Bowel Resection       Physical Exam  Constitutional:       Appearance: Normal appearance.   HENT:      Head: Normocephalic and atraumatic.      Mouth/Throat:      Mouth: Mucous membranes are moist.   Eyes:      Extraocular Movements: Extraocular movements intact.   Pulmonary:      Effort: Pulmonary effort is normal.   Abdominal:      General: Abdomen is flat.       Palpations: Abdomen is soft.   Skin:     General: Skin is warm.   Neurological:      General: No focal deficit present.      Mental Status: He is alert.   Psychiatric:         Mood and Affect: Mood normal.            ED Course/MDM  Diagnoses as of 02/21/24 1455   Chest wall pain     EKG interpreted by myself (ED attending physician): Normal sinus rhythm, rate of 74, right bundle branch block, normal axis, normal intervals, nonspecific ST segment changes, nonischemic EKG, similar in appearance from most recent EKG in December 12, 2023      This is a 68 y.o. male presenting to the ED presenting to the emergency department for chest pain which woke him up out of his sleep this morning.  Patient also complains of some mild shortness of breath with exertion, however states that this feels similar to his COPD.  On physical exam, the patient is resting comfortably in the bed, no acute distress.  Heart is regular rate and rhythm, lungs are clear to auscultation bilaterally.  There is mild chest wall tenderness to palpation which does reproduce his pain.  EKG is nonischemic.  Lab work including CBC, CMP, troponin x 2, BNP, mag, lactate, lipase were all grossly unremarkable.  CT chest/abdomen/pelvis was nonconcerning for any acute pathology.  It is overall unclear the cause of the patient's chest pain, however as it is reproducible on my exam, I have a suspicion that it could be musculoskeletal in nature as on further questioning the patient states that he did do some yard work yesterday.  Patient was advised to follow-up with his primary care provider and return to the emergency department for any worsening symptoms.  He was ultimately discharged home in stable condition.    Final Impression  1. Chest pain  Disposition/Plan: Discharge home  Condition at disposition: Stable.     Yana Duarte DO  Emergency Medicine Physician     Yana Duarte,   02/21/24 [id number]

## 2024-03-11 ENCOUNTER — PATIENT OUTREACH (OUTPATIENT)
Dept: PRIMARY CARE | Facility: CLINIC | Age: 69
End: 2024-03-11
Payer: MEDICARE

## 2024-03-12 DIAGNOSIS — R21 RASH AND OTHER NONSPECIFIC SKIN ERUPTION: ICD-10-CM

## 2024-03-12 RX ORDER — CLOTRIMAZOLE AND BETAMETHASONE DIPROPIONATE 10; .64 MG/G; MG/G
CREAM TOPICAL
Qty: 45 G | Refills: 1 | Status: SHIPPED | OUTPATIENT
Start: 2024-03-12 | End: 2024-04-29

## 2024-03-13 ENCOUNTER — HOSPITAL ENCOUNTER (OUTPATIENT)
Dept: RADIOLOGY | Facility: HOSPITAL | Age: 69
Discharge: HOME | End: 2024-03-13
Payer: MEDICARE

## 2024-03-13 ENCOUNTER — OFFICE VISIT (OUTPATIENT)
Dept: PRIMARY CARE | Facility: CLINIC | Age: 69
End: 2024-03-13
Payer: MEDICARE

## 2024-03-13 VITALS
HEIGHT: 71 IN | SYSTOLIC BLOOD PRESSURE: 119 MMHG | OXYGEN SATURATION: 95 % | DIASTOLIC BLOOD PRESSURE: 66 MMHG | TEMPERATURE: 97 F | BODY MASS INDEX: 32.73 KG/M2 | HEART RATE: 69 BPM | WEIGHT: 233.8 LBS

## 2024-03-13 DIAGNOSIS — M25.572 ACUTE LEFT ANKLE PAIN: Primary | ICD-10-CM

## 2024-03-13 DIAGNOSIS — M25.572 ACUTE LEFT ANKLE PAIN: ICD-10-CM

## 2024-03-13 PROCEDURE — 1160F RVW MEDS BY RX/DR IN RCRD: CPT | Performed by: FAMILY MEDICINE

## 2024-03-13 PROCEDURE — 73610 X-RAY EXAM OF ANKLE: CPT | Mod: LT

## 2024-03-13 PROCEDURE — 99212 OFFICE O/P EST SF 10 MIN: CPT | Performed by: FAMILY MEDICINE

## 2024-03-13 PROCEDURE — 1159F MED LIST DOCD IN RCRD: CPT | Performed by: FAMILY MEDICINE

## 2024-03-13 PROCEDURE — 3078F DIAST BP <80 MM HG: CPT | Performed by: FAMILY MEDICINE

## 2024-03-13 PROCEDURE — 3074F SYST BP LT 130 MM HG: CPT | Performed by: FAMILY MEDICINE

## 2024-03-13 PROCEDURE — 73610 X-RAY EXAM OF ANKLE: CPT | Mod: LEFT SIDE | Performed by: RADIOLOGY

## 2024-03-13 PROCEDURE — 3008F BODY MASS INDEX DOCD: CPT | Performed by: FAMILY MEDICINE

## 2024-03-13 ASSESSMENT — ENCOUNTER SYMPTOMS
LOSS OF SENSATION IN FEET: 0
OCCASIONAL FEELINGS OF UNSTEADINESS: 0
DEPRESSION: 0

## 2024-03-13 NOTE — PROGRESS NOTES
"Subjective   Patient ID: Juanjo Alexis is a 68 y.o. male who presents for Ankle Pain (Left ankle, woke up like that day before yesterday).    HPI  Here with left ankle pain started 2 days ago. Denies any trauma. Woke with pain.     Review of Systems  General: no fever  Eyes: no blurry vision  ENT: no sore throat, no ear pain  Resp: no cough, sob or wheezing  Cardio: no chest pain, no palpitations  Abd: no nausea/vomiting  : no dysuria, no increased urinary frequency      /66   Pulse 69   Temp 36.1 °C (97 °F)   Ht 1.803 m (5' 11\")   Wt 106 kg (233 lb 12.8 oz)   SpO2 95%   BMI 32.61 kg/m²       Objective   Physical Exam  Gen: NAD, alert  Head: normocephalic/atraumatic  Eyes: conjunctivae normal  Ears: canals clear bilaterally, TM normal   Nose: external nose normal   Oropharynx: clear   Resp: Clear to auscultation  CVS: Regular rate and rhythm  Abdomen: soft, NT, ND  Ext: left ankle tender to palpation, limited ROM    Assessment/Plan   Problem List Items Addressed This Visit    None  Visit Diagnoses       Acute left ankle pain    -  Primary  Xray left ankle               "

## 2024-03-15 ENCOUNTER — APPOINTMENT (OUTPATIENT)
Dept: PRIMARY CARE | Facility: CLINIC | Age: 69
End: 2024-03-15
Payer: MEDICARE

## 2024-03-17 DIAGNOSIS — I10 ESSENTIAL (PRIMARY) HYPERTENSION: ICD-10-CM

## 2024-03-18 RX ORDER — METOPROLOL TARTRATE 50 MG/1
TABLET ORAL
Qty: 180 TABLET | Refills: 3 | Status: SHIPPED | OUTPATIENT
Start: 2024-03-18

## 2024-04-17 DIAGNOSIS — I10 ESSENTIAL (PRIMARY) HYPERTENSION: ICD-10-CM

## 2024-04-17 RX ORDER — AMLODIPINE BESYLATE 10 MG/1
TABLET ORAL
Qty: 90 TABLET | Refills: 3 | Status: SHIPPED | OUTPATIENT
Start: 2024-04-17

## 2024-04-23 ENCOUNTER — TELEPHONE (OUTPATIENT)
Dept: PRIMARY CARE | Facility: CLINIC | Age: 69
End: 2024-04-23
Payer: MEDICARE

## 2024-04-23 DIAGNOSIS — L08.9 INFECTED CYST OF SKIN: Primary | ICD-10-CM

## 2024-04-23 DIAGNOSIS — L72.9 INFECTED CYST OF SKIN: Primary | ICD-10-CM

## 2024-04-23 RX ORDER — CEPHALEXIN 500 MG/1
500 CAPSULE ORAL 2 TIMES DAILY
Qty: 20 CAPSULE | Refills: 0 | Status: SHIPPED | OUTPATIENT
Start: 2024-04-23 | End: 2024-05-03

## 2024-04-23 NOTE — TELEPHONE ENCOUNTER
He is scheduled to see Dr. Zaidi 4/29/2024 for infected cyst on back of head, Dr. Zaidi is not in office and his staff recommended Juanjo call our office.  Do you want to see him in office or send abx

## 2024-04-28 DIAGNOSIS — F32.1 MODERATE MAJOR DEPRESSION (MULTI): ICD-10-CM

## 2024-04-28 DIAGNOSIS — R21 RASH AND OTHER NONSPECIFIC SKIN ERUPTION: ICD-10-CM

## 2024-04-29 ENCOUNTER — OFFICE VISIT (OUTPATIENT)
Dept: SURGERY | Facility: CLINIC | Age: 69
End: 2024-04-29
Payer: MEDICARE

## 2024-04-29 VITALS
WEIGHT: 231 LBS | HEIGHT: 71 IN | SYSTOLIC BLOOD PRESSURE: 146 MMHG | DIASTOLIC BLOOD PRESSURE: 70 MMHG | TEMPERATURE: 98 F | HEART RATE: 74 BPM | BODY MASS INDEX: 32.34 KG/M2

## 2024-04-29 DIAGNOSIS — R22.0 SCALP MASS: Primary | ICD-10-CM

## 2024-04-29 DIAGNOSIS — L72.3 INFECTED SEBACEOUS CYST OF SKIN: ICD-10-CM

## 2024-04-29 DIAGNOSIS — L08.9 INFECTED SEBACEOUS CYST OF SKIN: ICD-10-CM

## 2024-04-29 PROCEDURE — 3008F BODY MASS INDEX DOCD: CPT | Performed by: SURGERY

## 2024-04-29 PROCEDURE — 1159F MED LIST DOCD IN RCRD: CPT | Performed by: SURGERY

## 2024-04-29 PROCEDURE — 99212 OFFICE O/P EST SF 10 MIN: CPT | Performed by: SURGERY

## 2024-04-29 PROCEDURE — 3078F DIAST BP <80 MM HG: CPT | Performed by: SURGERY

## 2024-04-29 PROCEDURE — 0752T DGTZ GLS MCRSCP SLD LVL III: CPT

## 2024-04-29 PROCEDURE — 1160F RVW MEDS BY RX/DR IN RCRD: CPT | Performed by: SURGERY

## 2024-04-29 PROCEDURE — 88304 TISSUE EXAM BY PATHOLOGIST: CPT

## 2024-04-29 PROCEDURE — 88304 TISSUE EXAM BY PATHOLOGIST: CPT | Performed by: PATHOLOGY

## 2024-04-29 PROCEDURE — 3077F SYST BP >= 140 MM HG: CPT | Performed by: SURGERY

## 2024-04-29 PROCEDURE — 10060 I&D ABSCESS SIMPLE/SINGLE: CPT | Performed by: SURGERY

## 2024-04-29 RX ORDER — DULOXETIN HYDROCHLORIDE 60 MG/1
CAPSULE, DELAYED RELEASE ORAL
Qty: 90 CAPSULE | Refills: 1 | Status: SHIPPED | OUTPATIENT
Start: 2024-04-29

## 2024-04-29 RX ORDER — CLOTRIMAZOLE AND BETAMETHASONE DIPROPIONATE 10; .64 MG/G; MG/G
CREAM TOPICAL
Qty: 45 G | Refills: 1 | Status: SHIPPED | OUTPATIENT
Start: 2024-04-29

## 2024-04-29 ASSESSMENT — ENCOUNTER SYMPTOMS: WOUND: 1

## 2024-04-29 NOTE — PATIENT INSTRUCTIONS
You have an infected sebaceous cyst of the scalp.  We remove this today.  I want you to wash your scalp with soap and water.  Apply topical antibiotic daily.  See back in 4 weeks.  You do not have to take any further antibiotics

## 2024-04-29 NOTE — PROGRESS NOTES
Patient ID: Juanjo Alexis is a 69 y.o. male.  Presenting for left posterior scalp mass    Subjective   HPI  This patient presented with a mass of the left posterior scalp.  Significant pain he had some drainage.  Replacement oral antibiotics but was still having drainage.  He has a second mass below this.  Patient has received 7 to 10 days of antibiotics.    Review of Systems   Skin:  Positive for wound.       Objective   Physical Exam  Skin:            Comments: Left posterior scalp fluctuant mass with infected sebaceous cyst.     Patient ID: Juanjo Alexis is a 69 y.o. male.    Incision & Drainage    Date/Time: 4/29/2024 12:48 PM    Performed by: Augustin Zaidi MD  Authorized by: Augustin Zaidi MD    Consent:     Consent obtained:  Verbal    Risks discussed:  Bleeding  Universal protocol:     Procedure explained and questions answered to patient or proxy's satisfaction: yes    Location:     Type:  Abscess    Location:  Head  Procedure details:     Drainage:  Serous    Drainage amount:  Scant    Wound treatment:  Wound left open  Comments:      The abscess was unroofed by excising the nonviable skin.  The largest sebaceous cyst was identified.  There was sent to pathology.      Problem List Items Addressed This Visit       Infected sebaceous cyst of skin    Scalp mass - Primary    Relevant Orders    Surgical Pathology Exam        Assessment/Plan   Plan wash with soap and water.  Apply topical antibiotic daily.  Follow-up in 4 weeks.

## 2024-04-30 DIAGNOSIS — L72.3 INFECTED SEBACEOUS CYST OF SKIN: Primary | ICD-10-CM

## 2024-04-30 DIAGNOSIS — L08.9 INFECTED SEBACEOUS CYST OF SKIN: Primary | ICD-10-CM

## 2024-04-30 RX ORDER — TRAMADOL HYDROCHLORIDE 50 MG/1
50 TABLET ORAL EVERY 8 HOURS PRN
Qty: 10 TABLET | Refills: 0 | Status: SHIPPED | OUTPATIENT
Start: 2024-04-30 | End: 2024-05-05

## 2024-05-07 LAB
LABORATORY COMMENT REPORT: NORMAL
PATH REPORT.FINAL DX SPEC: NORMAL
PATH REPORT.GROSS SPEC: NORMAL
PATH REPORT.RELEVANT HX SPEC: NORMAL
PATH REPORT.TOTAL CANCER: NORMAL

## 2024-05-15 ENCOUNTER — HOSPITAL ENCOUNTER (EMERGENCY)
Facility: HOSPITAL | Age: 69
Discharge: HOME | End: 2024-05-15
Payer: MEDICARE

## 2024-05-15 ENCOUNTER — APPOINTMENT (OUTPATIENT)
Dept: RADIOLOGY | Facility: HOSPITAL | Age: 69
End: 2024-05-15
Payer: MEDICARE

## 2024-05-15 VITALS
SYSTOLIC BLOOD PRESSURE: 124 MMHG | RESPIRATION RATE: 16 BRPM | DIASTOLIC BLOOD PRESSURE: 72 MMHG | HEART RATE: 82 BPM | TEMPERATURE: 98.1 F | OXYGEN SATURATION: 89 % | BODY MASS INDEX: 32.2 KG/M2 | HEIGHT: 71 IN | WEIGHT: 230 LBS

## 2024-05-15 DIAGNOSIS — M65.4 DE QUERVAIN'S TENOSYNOVITIS, RIGHT: ICD-10-CM

## 2024-05-15 DIAGNOSIS — M48.02 CERVICAL STENOSIS OF SPINE: Primary | ICD-10-CM

## 2024-05-15 PROCEDURE — 72125 CT NECK SPINE W/O DYE: CPT

## 2024-05-15 PROCEDURE — 72125 CT NECK SPINE W/O DYE: CPT | Performed by: RADIOLOGY

## 2024-05-15 PROCEDURE — 2500000001 HC RX 250 WO HCPCS SELF ADMINISTERED DRUGS (ALT 637 FOR MEDICARE OP)

## 2024-05-15 PROCEDURE — 73110 X-RAY EXAM OF WRIST: CPT | Mod: RT

## 2024-05-15 PROCEDURE — 2500000004 HC RX 250 GENERAL PHARMACY W/ HCPCS (ALT 636 FOR OP/ED): Performed by: PHYSICIAN ASSISTANT

## 2024-05-15 PROCEDURE — 73110 X-RAY EXAM OF WRIST: CPT | Mod: RIGHT SIDE | Performed by: RADIOLOGY

## 2024-05-15 PROCEDURE — 99284 EMERGENCY DEPT VISIT MOD MDM: CPT | Mod: 25

## 2024-05-15 PROCEDURE — 96372 THER/PROPH/DIAG INJ SC/IM: CPT | Performed by: PHYSICIAN ASSISTANT

## 2024-05-15 RX ORDER — HYDROCODONE BITARTRATE AND ACETAMINOPHEN 5; 325 MG/1; MG/1
TABLET ORAL
Status: COMPLETED
Start: 2024-05-15 | End: 2024-05-15

## 2024-05-15 RX ORDER — HYDROCODONE BITARTRATE AND ACETAMINOPHEN 5; 325 MG/1; MG/1
1 TABLET ORAL ONCE
Status: COMPLETED | OUTPATIENT
Start: 2024-05-15 | End: 2024-05-15

## 2024-05-15 RX ORDER — PREDNISONE 20 MG/1
TABLET ORAL
Qty: 18 TABLET | Refills: 0 | Status: SHIPPED | OUTPATIENT
Start: 2024-05-15 | End: 2024-05-23 | Stop reason: SDUPTHER

## 2024-05-15 RX ORDER — KETOROLAC TROMETHAMINE 30 MG/ML
30 INJECTION, SOLUTION INTRAMUSCULAR; INTRAVENOUS ONCE
Status: COMPLETED | OUTPATIENT
Start: 2024-05-15 | End: 2024-05-15

## 2024-05-15 RX ADMIN — HYDROCODONE BITARTRATE AND ACETAMINOPHEN 1 TABLET: 5; 325 TABLET ORAL at 17:38

## 2024-05-15 RX ADMIN — KETOROLAC TROMETHAMINE 30 MG: 30 INJECTION, SOLUTION INTRAMUSCULAR at 16:25

## 2024-05-15 RX ADMIN — DEXAMETHASONE SODIUM PHOSPHATE 10 MG: 4 INJECTION, SOLUTION INTRAMUSCULAR; INTRAVENOUS at 17:38

## 2024-05-15 ASSESSMENT — PAIN SCALES - GENERAL
PAINLEVEL_OUTOF10: 8
PAINLEVEL_OUTOF10: 10 - WORST POSSIBLE PAIN
PAINLEVEL_OUTOF10: 8

## 2024-05-15 ASSESSMENT — PAIN - FUNCTIONAL ASSESSMENT
PAIN_FUNCTIONAL_ASSESSMENT: 0-10
PAIN_FUNCTIONAL_ASSESSMENT: 0-10

## 2024-05-15 ASSESSMENT — PAIN DESCRIPTION - LOCATION
LOCATION: WRIST
LOCATION_2: NECK

## 2024-05-15 ASSESSMENT — PAIN DESCRIPTION - ORIENTATION: ORIENTATION: RIGHT

## 2024-05-15 ASSESSMENT — COLUMBIA-SUICIDE SEVERITY RATING SCALE - C-SSRS
2. HAVE YOU ACTUALLY HAD ANY THOUGHTS OF KILLING YOURSELF?: NO
6. HAVE YOU EVER DONE ANYTHING, STARTED TO DO ANYTHING, OR PREPARED TO DO ANYTHING TO END YOUR LIFE?: NO
1. IN THE PAST MONTH, HAVE YOU WISHED YOU WERE DEAD OR WISHED YOU COULD GO TO SLEEP AND NOT WAKE UP?: NO

## 2024-05-15 ASSESSMENT — PAIN DESCRIPTION - ONSET: ONSET: PROGRESSIVE

## 2024-05-15 ASSESSMENT — PAIN DESCRIPTION - PAIN TYPE: TYPE: ACUTE PAIN

## 2024-05-15 ASSESSMENT — PAIN DESCRIPTION - FREQUENCY: FREQUENCY: CONSTANT/CONTINUOUS

## 2024-05-15 ASSESSMENT — PAIN DESCRIPTION - PROGRESSION: CLINICAL_PROGRESSION: GRADUALLY WORSENING

## 2024-05-16 ENCOUNTER — OFFICE VISIT (OUTPATIENT)
Dept: PRIMARY CARE | Facility: CLINIC | Age: 69
End: 2024-05-16
Payer: MEDICARE

## 2024-05-16 ENCOUNTER — HOSPITAL ENCOUNTER (OUTPATIENT)
Dept: RADIOLOGY | Facility: HOSPITAL | Age: 69
Discharge: HOME | End: 2024-05-16
Payer: MEDICARE

## 2024-05-16 ENCOUNTER — LAB (OUTPATIENT)
Dept: LAB | Facility: LAB | Age: 69
End: 2024-05-16
Payer: MEDICARE

## 2024-05-16 VITALS
OXYGEN SATURATION: 96 % | HEIGHT: 71 IN | DIASTOLIC BLOOD PRESSURE: 74 MMHG | TEMPERATURE: 97 F | BODY MASS INDEX: 32.2 KG/M2 | WEIGHT: 230 LBS | SYSTOLIC BLOOD PRESSURE: 134 MMHG | HEART RATE: 78 BPM

## 2024-05-16 DIAGNOSIS — M79.641 RIGHT HAND PAIN: ICD-10-CM

## 2024-05-16 DIAGNOSIS — G89.29 CHRONIC LOW BACK PAIN WITHOUT SCIATICA, UNSPECIFIED BACK PAIN LATERALITY: ICD-10-CM

## 2024-05-16 DIAGNOSIS — Z00.00 MEDICARE ANNUAL WELLNESS VISIT, SUBSEQUENT: Primary | ICD-10-CM

## 2024-05-16 DIAGNOSIS — J44.9 CHRONIC OBSTRUCTIVE PULMONARY DISEASE, UNSPECIFIED COPD TYPE (MULTI): ICD-10-CM

## 2024-05-16 DIAGNOSIS — M48.02 CERVICAL STENOSIS OF SPINAL CANAL: ICD-10-CM

## 2024-05-16 DIAGNOSIS — M54.50 CHRONIC LOW BACK PAIN WITHOUT SCIATICA, UNSPECIFIED BACK PAIN LATERALITY: ICD-10-CM

## 2024-05-16 LAB — RHEUMATOID FACT SER NEPH-ACNC: <10 IU/ML (ref 0–15)

## 2024-05-16 PROCEDURE — G0439 PPPS, SUBSEQ VISIT: HCPCS | Performed by: FAMILY MEDICINE

## 2024-05-16 PROCEDURE — 1159F MED LIST DOCD IN RCRD: CPT | Performed by: FAMILY MEDICINE

## 2024-05-16 PROCEDURE — 36415 COLL VENOUS BLD VENIPUNCTURE: CPT

## 2024-05-16 PROCEDURE — 96372 THER/PROPH/DIAG INJ SC/IM: CPT | Performed by: FAMILY MEDICINE

## 2024-05-16 PROCEDURE — 86431 RHEUMATOID FACTOR QUANT: CPT

## 2024-05-16 PROCEDURE — 1170F FXNL STATUS ASSESSED: CPT | Performed by: FAMILY MEDICINE

## 2024-05-16 PROCEDURE — 1160F RVW MEDS BY RX/DR IN RCRD: CPT | Performed by: FAMILY MEDICINE

## 2024-05-16 PROCEDURE — 73130 X-RAY EXAM OF HAND: CPT | Mod: RT

## 2024-05-16 PROCEDURE — 86200 CCP ANTIBODY: CPT

## 2024-05-16 PROCEDURE — 73130 X-RAY EXAM OF HAND: CPT | Mod: RIGHT SIDE | Performed by: RADIOLOGY

## 2024-05-16 PROCEDURE — 3075F SYST BP GE 130 - 139MM HG: CPT | Performed by: FAMILY MEDICINE

## 2024-05-16 PROCEDURE — 3008F BODY MASS INDEX DOCD: CPT | Performed by: FAMILY MEDICINE

## 2024-05-16 PROCEDURE — 3078F DIAST BP <80 MM HG: CPT | Performed by: FAMILY MEDICINE

## 2024-05-16 RX ORDER — KETOROLAC TROMETHAMINE 30 MG/ML
30 INJECTION, SOLUTION INTRAMUSCULAR; INTRAVENOUS ONCE
Status: COMPLETED | OUTPATIENT
Start: 2024-05-16 | End: 2024-05-16

## 2024-05-16 RX ADMIN — KETOROLAC TROMETHAMINE 30 MG: 30 INJECTION, SOLUTION INTRAMUSCULAR; INTRAVENOUS at 12:03

## 2024-05-16 ASSESSMENT — ENCOUNTER SYMPTOMS
DEPRESSION: 0
OCCASIONAL FEELINGS OF UNSTEADINESS: 0
LOSS OF SENSATION IN FEET: 0

## 2024-05-16 ASSESSMENT — ACTIVITIES OF DAILY LIVING (ADL)
DOING_HOUSEWORK: INDEPENDENT
BATHING: INDEPENDENT
MANAGING_FINANCES: INDEPENDENT
GROCERY_SHOPPING: INDEPENDENT
DRESSING: INDEPENDENT
TAKING_MEDICATION: INDEPENDENT

## 2024-05-16 NOTE — PROGRESS NOTES
"Subjective   Reason for Visit: Juanjo Alexis is an 69 y.o. male here for a Medicare Wellness visit.     Past Medical, Surgical, and Family History reviewed and updated in chart.    Reviewed all medications by prescribing practitioner or clinical pharmacist (such as prescriptions, OTCs, herbal therapies and supplements) and documented in the medical record.    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 Right hand swollen and painful. Discharged with prednisone. Xray wrist negative for fracture. Did not do a hand xray  Patient had CT scan which showed moderate to severe foraminal stenosis in multiple areas.   Has been having flare up of his low back pain, would like toradol injection.  Needs refill of albuterol     Patient Care Team:  Lupe Woody MD as PCP - General  Lupe Woody MD as PCP - [employer] PCP     Review of Systems  General: no fever  Eyes: no blurry vision  ENT: no sore throat, no ear pain  Resp: no cough, sob or wheezing  Cardio: no chest pain, no palpitations  Abd: no nausea/vomiting  : no dysuria, no increased urinary frequency    Objective   Vitals:  /74   Pulse 78   Temp 36.1 °C (97 °F)   Ht 1.803 m (5' 11\")   Wt 104 kg (230 lb)   SpO2 96%   BMI 32.08 kg/m²       Physical Exam  Gen: NAD, alert  Head: normocephalic/atraumatic  Eyes: conjunctivae normal  Ears: canals clear bilaterally, TM normal   Nose: external nose normal   Oropharynx: clear   Resp: Clear to auscultation  CVS: Regular rate and rhythm  Abdomen: soft, NT, ND  Ext: right hand swollen and tender to palpation, limited ROM of fingers    Assessment/Plan   Problem List Items Addressed This Visit       Chronic obstructive pulmonary disease (Multi)    Relevant Medications    albuterol (Ventolin HFA) 90 mcg/actuation inhaler     Other " Visit Diagnoses       Medicare annual wellness visit, subsequent    -  Primary    BMI 32.0-32.9,adult        Cervical stenosis of spinal canal        Relevant Orders    Referral to Pain Medicine    Chronic low back pain without sciatica, unspecified back pain laterality        Relevant Medications    ketorolac (Toradol) injection 30 mg (Completed)    Right hand pain        Relevant Orders    Rheumatoid factor (Completed)    Citrulline Antibody, IgG (Completed)  Xray right hand 1-2 views

## 2024-05-17 LAB — CCP IGG SERPL-ACNC: <1 U/ML

## 2024-05-18 NOTE — ED PROVIDER NOTES
HPI   Chief Complaint   Patient presents with    Wrist Pain    Torticollis       History of present illness:  69-year-old male presents the emergency room complaints of right-sided wrist pain and right-sided neck pain with radiation into his right arm.  The patient states he works as a  and he states that he is commonly using wrenches all day as well as other power tools.  He states that he has been having persistent pain though in his right wrist to the point now where he is having difficulty functioning with it.  He states he is left-hand dominant but he states he does use his right hand frequently at work.  He denies any recent injuries or trauma to his wrist.  He states he has known numbness or tingling to the fingers but states the pain does radiate sometimes all the way down into his fingertips.  He states he also has persistent pain in his right neck.  He states he has not been taking thing for this.  He does not currently have a primary care doctor.  He states he is a daily smoker.  He denies any illicit drug use or daily alcohol consumption.    Social history: Negative for alcohol and drug use.    Review of systems:   Gen.: No weight loss, fatigue, anorexia, insomnia, fever.   Eyes: No vision loss, double vision  ENT: No pharyngitis, headache, vertigo   Cardiac: No chest pain, palpitations, syncope, near syncope.   Pulmonary: No shortness of breath, cough, hemoptysis.   Heme/lymph: No swollen glands, fever, bleeding.   GI: No abdominal pain, change in bowel habits, melena, hematemesis, hematochezia, nausea, vomiting, diarrhea.   : No discharge, dysuria, frequency, urgency, hematuria.   Musculoskeletal: No  joint swelling.   Skin: No rashes.   Review of systems is otherwise negative unless stated above or in history of present illness.        Physical exam:  General: Vitals noted, no distress. Afebrile.   EENT: No pain to palpation over the cervical spine no step-off appreciated, no pain with axial  loading present  Cardiac: Regular, rate, rhythm, no murmur.   Pulmonary: Lungs clear bilaterally with good aeration. No adventitious breath sounds.   Abdomen: Soft, nonsurgical. Nontender. No peritoneal signs. Normoactive bowel sounds.   Extremities: No peripheral edema.  Positive Finkelstein test on the right side, there is pain to palpation over the dorsal aspect of the wrist and is slightly swollen compared to the left, good cap refill and sensation in all fingers, good  strength bilaterally, full range of motion of both wrists are intact, 5 out of 5 strength in flexion and extension,  Skin: No rash.   Neuro: No focal neurologic deficits      Medical decision making:   Testing: CT scan of cervical spine shows arthritic changes and bulging disks at this time but no acute fracture or other acute findings seen as interpreted by radiology, right wrist x-rays show concerns for arthritic changes but no other acute findings as interpreted by radiology  Plan: Home-going.  Discussed differential. Will follow-up with pain management, orthopedic hand surgery in the next 2-3 days. Return if worse. They understand return precautions and discharge instructions. Patient and family/friend/caregiver are in agreement of this plan.  69-year-old male presents the emergency room complaints of right-sided wrist pain and right-sided neck pain with radiation into his right arm.  The patient states he works as a  and he states that he is commonly using wrenches all day as well as other power tools.  He states that he has been having persistent pain though in his right wrist to the point now where he is having difficulty functioning with it.  He states he is left-hand dominant but he states he does use his right hand frequently at work.  He denies any recent injuries or trauma to his wrist.  He states he has known numbness or tingling to the fingers but states the pain does radiate sometimes all the way down into his  fingertips.  He states he also has persistent pain in his right neck.  He states he has not been taking thing for this.  He does not currently have a primary care doctor.  He states he is a daily smoker.  He denies any illicit drug use or daily alcohol consumption. Extremities: No peripheral edema.  Positive Finkelstein test on the right side, there is pain to palpation over the dorsal aspect of the wrist and is slightly swollen compared to the left, good cap refill and sensation in all fingers, good  strength bilaterally, full range of motion of both wrists are intact, 5 out of 5 strength in flexion and extension. EENT: No pain to palpation over the cervical spine no step-off appreciated, no pain with axial loading present.  I explained to the patient the test results at this time and that be sending home at this time a short course of steroids.  I encouraged him to please follow-up with his primary care doctor this week.  He was agreeable to this plan was discharged at this time.  He was also given referral to orthopedic hand and wrist surgery as well as pain management.  Impression:   1.  Cervical stenosis  2.  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    Neuralgia and neuritis, unspecified     Neuritis    Nontoxic single thyroid nodule     Solitary thyroid nodule    Other specified anxiety disorders     Depression with anxiety     Personal history of benign carcinoid tumor 12/16/2015    History of benign carcinoid tumor     Past Surgical History:   Procedure Laterality Date    COLONOSCOPY  05/20/2013    Complete Colonoscopy    OTHER SURGICAL HISTORY  03/18/2013    Excision Of Burn Tissue Shoulder    OTHER SURGICAL HISTORY  03/04/2021    Pancreatic surgery    SHOULDER SURGERY  04/01/2013    Shoulder Surgery    SMALL INTESTINE SURGERY  06/30/2017    Small Bowel Resection     Family History   Problem Relation Name Age of Onset    Lung cancer Mother      Heart failure Father       Social History     Tobacco Use    Smoking status: Every Day     Current packs/day: 1.00     Average packs/day: 1 pack/day for 40.0 years (40.0 ttl pk-yrs)     Types: Cigarettes    Smokeless tobacco: Never   Substance Use Topics    Alcohol use: Not Currently    Drug use: Never       Physical Exam   ED Triage Vitals   Temperature Heart Rate Respirations BP   05/15/24 1445 05/15/24 1445 05/15/24 1445 05/15/24 1445   36.7 °C (98.1 °F) 83 18 121/65      Pulse Ox Temp src Heart Rate Source Patient Position   05/15/24 1445 -- -- 05/15/24 1751   94 %   Sitting      BP Location FiO2 (%)     05/15/24 1751 --     Right arm        Physical Exam    ED Course & MDM   Diagnoses as of 05/18/24 1703   Cervical stenosis of spine   De Quervain's tenosynovitis, right       Medical Decision Making      Procedure  Procedures     Praneeht Hanley PA-C  05/18/24 1706

## 2024-05-19 RX ORDER — ALBUTEROL SULFATE 90 UG/1
1 AEROSOL, METERED RESPIRATORY (INHALATION) EVERY 6 HOURS PRN
Qty: 8 G | Refills: 5 | Status: SHIPPED | OUTPATIENT
Start: 2024-05-19 | End: 2025-05-19

## 2024-05-20 ENCOUNTER — TELEPHONE (OUTPATIENT)
Dept: PRIMARY CARE | Facility: CLINIC | Age: 69
End: 2024-05-20
Payer: MEDICARE

## 2024-05-23 ENCOUNTER — TELEPHONE (OUTPATIENT)
Dept: PRIMARY CARE | Facility: CLINIC | Age: 69
End: 2024-05-23
Payer: MEDICARE

## 2024-05-23 DIAGNOSIS — J44.1 COPD EXACERBATION (MULTI): Primary | ICD-10-CM

## 2024-05-23 RX ORDER — PREDNISONE 20 MG/1
TABLET ORAL
Qty: 18 TABLET | Refills: 0 | Status: SHIPPED | OUTPATIENT
Start: 2024-05-23 | End: 2024-05-23 | Stop reason: WASHOUT

## 2024-05-23 RX ORDER — PREDNISONE 20 MG/1
TABLET ORAL
Qty: 18 TABLET | Refills: 0 | Status: SHIPPED | OUTPATIENT
Start: 2024-05-23

## 2024-05-23 RX ORDER — DOXYCYCLINE 100 MG/1
100 CAPSULE ORAL 2 TIMES DAILY
Qty: 14 CAPSULE | Refills: 0 | Status: SHIPPED | OUTPATIENT
Start: 2024-05-23 | End: 2024-05-30

## 2024-05-23 NOTE — TELEPHONE ENCOUNTER
Sent doxycycline and prednisone. He should still have 2 more days of the prednisone that he is taking so it may be too soon for him to fill that

## 2024-05-23 NOTE — TELEPHONE ENCOUNTER
Patient is coughing/productive, sob and wheezing allergies are bothering him.  Will you send abx and prednisone?  Ongoing for 3 days.  [facility]  No f/n/v/d, sore throat body aches, chills

## 2024-05-26 DIAGNOSIS — S73.119A: ICD-10-CM

## 2024-05-27 RX ORDER — CYCLOBENZAPRINE HCL 10 MG
10 TABLET ORAL NIGHTLY PRN
Qty: 90 TABLET | Refills: 3 | Status: SHIPPED | OUTPATIENT
Start: 2024-05-27

## 2024-06-24 DIAGNOSIS — M54.12 CERVICAL RADICULOPATHY: ICD-10-CM

## 2024-06-24 DIAGNOSIS — R21 RASH AND OTHER NONSPECIFIC SKIN ERUPTION: ICD-10-CM

## 2024-06-25 RX ORDER — CLOTRIMAZOLE AND BETAMETHASONE DIPROPIONATE 10; .64 MG/G; MG/G
CREAM TOPICAL
Qty: 45 G | Refills: 1 | Status: SHIPPED | OUTPATIENT
Start: 2024-06-25

## 2024-06-25 RX ORDER — PREGABALIN 200 MG/1
CAPSULE ORAL
Qty: 60 CAPSULE | Refills: 5 | Status: SHIPPED | OUTPATIENT
Start: 2024-06-25

## 2024-07-07 DIAGNOSIS — J44.1 COPD EXACERBATION (MULTI): ICD-10-CM

## 2024-07-08 RX ORDER — PREDNISONE 20 MG/1
TABLET ORAL
Qty: 18 TABLET | Refills: 0 | OUTPATIENT
Start: 2024-07-08

## 2024-07-11 DIAGNOSIS — E53.8 DEFICIENCY OF OTHER SPECIFIED B GROUP VITAMINS: ICD-10-CM

## 2024-07-11 RX ORDER — NEEDLES, FILTER 19GX1 1/2"
NEEDLE, DISPOSABLE MISCELLANEOUS
Qty: 12 EACH | Refills: 0 | Status: SHIPPED | OUTPATIENT
Start: 2024-07-11

## 2024-08-13 ENCOUNTER — TELEPHONE (OUTPATIENT)
Dept: PRIMARY CARE | Facility: CLINIC | Age: 69
End: 2024-08-13
Payer: MEDICARE

## 2024-08-13 ENCOUNTER — TELEMEDICINE (OUTPATIENT)
Dept: PRIMARY CARE | Facility: CLINIC | Age: 69
End: 2024-08-13
Payer: MEDICARE

## 2024-08-13 DIAGNOSIS — B35.6 TINEA CRURIS: ICD-10-CM

## 2024-08-13 DIAGNOSIS — J44.1 COPD EXACERBATION (MULTI): Primary | ICD-10-CM

## 2024-08-13 PROCEDURE — 99441 PR PHYS/QHP TELEPHONE EVALUATION 5-10 MIN: CPT | Performed by: FAMILY MEDICINE

## 2024-08-13 RX ORDER — PREDNISONE 20 MG/1
TABLET ORAL
Qty: 18 TABLET | Refills: 0 | Status: SHIPPED | OUTPATIENT
Start: 2024-08-13

## 2024-08-13 RX ORDER — DOXYCYCLINE 100 MG/1
100 CAPSULE ORAL 2 TIMES DAILY
Qty: 20 CAPSULE | Refills: 0 | Status: SHIPPED | OUTPATIENT
Start: 2024-08-13 | End: 2024-08-23

## 2024-08-13 RX ORDER — KETOCONAZOLE 20 MG/G
CREAM TOPICAL DAILY
Qty: 30 G | Refills: 0 | Status: SHIPPED | OUTPATIENT
Start: 2024-08-13

## 2024-08-13 NOTE — TELEPHONE ENCOUNTER
- appetite improved. He is drinking 2-3 supplements daily.  Pt states he is personally trying to increase protein in diet.       sent

## 2024-08-13 NOTE — TELEPHONE ENCOUNTER
Coughing, sob. Been going on for about a week. Been using nebulizer/ inhaler. Would like a abx/steroid for lungs.

## 2024-08-21 ENCOUNTER — APPOINTMENT (OUTPATIENT)
Dept: PRIMARY CARE | Facility: CLINIC | Age: 69
End: 2024-08-21
Payer: MEDICARE

## 2024-08-21 VITALS
OXYGEN SATURATION: 97 % | SYSTOLIC BLOOD PRESSURE: 124 MMHG | TEMPERATURE: 97.3 F | BODY MASS INDEX: 31.53 KG/M2 | WEIGHT: 225.2 LBS | DIASTOLIC BLOOD PRESSURE: 70 MMHG | HEART RATE: 72 BPM | HEIGHT: 71 IN

## 2024-08-21 DIAGNOSIS — I10 ESSENTIAL HYPERTENSION: Primary | ICD-10-CM

## 2024-08-21 PROCEDURE — 1159F MED LIST DOCD IN RCRD: CPT | Performed by: FAMILY MEDICINE

## 2024-08-21 PROCEDURE — 99212 OFFICE O/P EST SF 10 MIN: CPT | Performed by: FAMILY MEDICINE

## 2024-08-21 PROCEDURE — 3008F BODY MASS INDEX DOCD: CPT | Performed by: FAMILY MEDICINE

## 2024-08-21 PROCEDURE — 3074F SYST BP LT 130 MM HG: CPT | Performed by: FAMILY MEDICINE

## 2024-08-21 PROCEDURE — 3078F DIAST BP <80 MM HG: CPT | Performed by: FAMILY MEDICINE

## 2024-08-21 RX ORDER — PREGABALIN 200 MG/1
200 CAPSULE ORAL 2 TIMES DAILY
Qty: 60 CAPSULE | Refills: 5 | Status: CANCELLED | OUTPATIENT
Start: 2024-08-21

## 2024-08-21 NOTE — PROGRESS NOTES
"Subjective   Patient ID: Juanjo Alexis is a 69 y.o. male who presents for Follow-up (3 mth. ).  HPI  Here for follow up  COPD exacerbation better with doxycycline and prednisone. No sob or wheezing.   HTN under control with meds  Using ketoconazole for jock itch, improving    Review of Systems  General: no fever  Eyes: no blurry vision  ENT: no sore throat, no ear pain  Resp: no cough, sob or wheezing  Cardio: no chest pain, no palpitations  Abd: no nausea/vomiting  : no dysuria, no increased urinary frequency      /70   Pulse 72   Temp 36.3 °C (97.3 °F)   Ht 1.803 m (5' 11\")   Wt 102 kg (225 lb 3.2 oz)   SpO2 97%   BMI 31.41 kg/m²       Objective   Physical Exam  Gen: NAD, alert  Head: normocephalic/atraumatic  Eyes: conjunctivae normal  Ears: canals clear bilaterally, TM normal   Nose: external nose normal   Oropharynx: clear   Resp: Clear to auscultation  CVS: Regular rate and rhythm  Abdomen: soft, NT, ND  Ext: no edema, NT of lower extremities  Neuro: gait normal   : mild erythema in between thighs and groin and under scrotum.    Assessment/Plan   Problem List Items Addressed This Visit       Essential hypertension - Primary  Continue current regimen          "

## 2024-08-28 PROBLEM — R10.9 ABDOMINAL PAIN: Status: RESOLVED | Noted: 2023-12-13 | Resolved: 2024-08-28

## 2024-08-28 PROBLEM — R22.0 SCALP MASS: Status: RESOLVED | Noted: 2024-04-29 | Resolved: 2024-08-28

## 2024-08-28 PROBLEM — R21 GROIN RASH: Status: RESOLVED | Noted: 2023-11-01 | Resolved: 2024-08-28

## 2024-08-28 PROBLEM — R07.9 CHEST PAIN AT REST: Status: RESOLVED | Noted: 2023-12-12 | Resolved: 2024-08-28

## 2024-08-28 PROBLEM — F41.8 DEPRESSION WITH ANXIETY: Status: RESOLVED | Noted: 2023-05-03 | Resolved: 2024-08-28

## 2024-08-28 PROBLEM — Z72.0 TOBACCO ABUSE: Status: RESOLVED | Noted: 2023-12-13 | Resolved: 2024-08-28

## 2024-08-29 DIAGNOSIS — J44.1 COPD EXACERBATION (MULTI): ICD-10-CM

## 2024-08-29 DIAGNOSIS — I10 ESSENTIAL HYPERTENSION: ICD-10-CM

## 2024-08-29 DIAGNOSIS — B35.6 TINEA CRURIS: ICD-10-CM

## 2024-08-29 RX ORDER — KETOCONAZOLE 20 MG/G
CREAM TOPICAL DAILY
Qty: 30 G | Refills: 0 | Status: SHIPPED | OUTPATIENT
Start: 2024-08-29

## 2024-08-29 RX ORDER — LOSARTAN POTASSIUM 25 MG/1
25 TABLET ORAL DAILY
Qty: 90 TABLET | Refills: 1 | Status: SHIPPED | OUTPATIENT
Start: 2024-08-29

## 2024-08-29 RX ORDER — PREDNISONE 20 MG/1
TABLET ORAL
Qty: 18 TABLET | Refills: 0 | OUTPATIENT
Start: 2024-08-29

## 2024-09-26 ENCOUNTER — APPOINTMENT (OUTPATIENT)
Dept: PRIMARY CARE | Facility: CLINIC | Age: 69
End: 2024-09-26
Payer: MEDICARE

## 2024-09-30 DIAGNOSIS — F32.1 MODERATE MAJOR DEPRESSION (MULTI): ICD-10-CM

## 2024-09-30 DIAGNOSIS — B35.6 TINEA CRURIS: ICD-10-CM

## 2024-10-01 RX ORDER — DULOXETIN HYDROCHLORIDE 30 MG/1
CAPSULE, DELAYED RELEASE ORAL
Qty: 90 CAPSULE | Refills: 1 | Status: SHIPPED | OUTPATIENT
Start: 2024-10-01

## 2024-10-01 RX ORDER — KETOCONAZOLE 20 MG/G
CREAM TOPICAL DAILY
Qty: 30 G | Refills: 1 | OUTPATIENT
Start: 2024-10-01

## 2024-10-03 ENCOUNTER — OFFICE VISIT (OUTPATIENT)
Dept: PRIMARY CARE | Facility: CLINIC | Age: 69
End: 2024-10-03
Payer: MEDICARE

## 2024-10-03 VITALS
DIASTOLIC BLOOD PRESSURE: 70 MMHG | SYSTOLIC BLOOD PRESSURE: 130 MMHG | OXYGEN SATURATION: 96 % | TEMPERATURE: 97.7 F | BODY MASS INDEX: 30.97 KG/M2 | WEIGHT: 221.2 LBS | HEIGHT: 71 IN | HEART RATE: 86 BPM

## 2024-10-03 DIAGNOSIS — R73.9 BORDERLINE HYPERGLYCEMIA: Primary | ICD-10-CM

## 2024-10-03 DIAGNOSIS — B35.3 TINEA PEDIS OF BOTH FEET: ICD-10-CM

## 2024-10-03 DIAGNOSIS — B35.6 TINEA CRURIS: ICD-10-CM

## 2024-10-03 DIAGNOSIS — Z23 NEED FOR INFLUENZA VACCINATION: ICD-10-CM

## 2024-10-03 LAB — POC HEMOGLOBIN A1C: 6.2 % (ref 4.2–6.5)

## 2024-10-03 PROCEDURE — 90662 IIV NO PRSV INCREASED AG IM: CPT | Performed by: FAMILY MEDICINE

## 2024-10-03 PROCEDURE — G0008 ADMIN INFLUENZA VIRUS VAC: HCPCS | Performed by: FAMILY MEDICINE

## 2024-10-03 PROCEDURE — 3075F SYST BP GE 130 - 139MM HG: CPT | Performed by: FAMILY MEDICINE

## 2024-10-03 PROCEDURE — 1159F MED LIST DOCD IN RCRD: CPT | Performed by: FAMILY MEDICINE

## 2024-10-03 PROCEDURE — 3008F BODY MASS INDEX DOCD: CPT | Performed by: FAMILY MEDICINE

## 2024-10-03 PROCEDURE — 83036 HEMOGLOBIN GLYCOSYLATED A1C: CPT | Performed by: FAMILY MEDICINE

## 2024-10-03 PROCEDURE — 3078F DIAST BP <80 MM HG: CPT | Performed by: FAMILY MEDICINE

## 2024-10-03 PROCEDURE — 99214 OFFICE O/P EST MOD 30 MIN: CPT | Performed by: FAMILY MEDICINE

## 2024-10-03 RX ORDER — KETOCONAZOLE 20 MG/G
CREAM TOPICAL DAILY
Qty: 60 G | Refills: 0 | Status: SHIPPED | OUTPATIENT
Start: 2024-10-03

## 2024-10-03 RX ORDER — FLUCONAZOLE 100 MG/1
100 TABLET ORAL WEEKLY
Qty: 4 TABLET | Refills: 0 | Status: SHIPPED | OUTPATIENT
Start: 2024-10-03

## 2024-10-03 RX ORDER — NYSTATIN 100000 [USP'U]/G
1 POWDER TOPICAL 2 TIMES DAILY
Qty: 60 G | Refills: 0 | Status: SHIPPED | OUTPATIENT
Start: 2024-10-03 | End: 2025-10-03

## 2024-10-03 ASSESSMENT — PATIENT HEALTH QUESTIONNAIRE - PHQ9
SUM OF ALL RESPONSES TO PHQ9 QUESTIONS 1 AND 2: 0
2. FEELING DOWN, DEPRESSED OR HOPELESS: NOT AT ALL
1. LITTLE INTEREST OR PLEASURE IN DOING THINGS: NOT AT ALL

## 2024-10-03 NOTE — PROGRESS NOTES
"Subjective   Patient ID: Juanjo Alexis is a 69 y.o. male who presents for Rash (Groin. Very red and patchy. Had it for awhile. No OTC is working. ).    HPI  Here for follow up   Has been having rash in his groin area. Been using ketoconazole not helping, but does have athletes feet and that has been helping for that, needs refill.     Review of Systems  General: no fever  Eyes: no blurry vision  ENT: no sore throat, no ear pain  Resp: no cough, sob or wheezing  Cardio: no chest pain, no palpitations  Abd: no nausea/vomiting  : no dysuria, no increased urinary frequency      /70   Pulse 86   Temp 36.5 °C (97.7 °F)   Ht 1.803 m (5' 11\")   Wt 100 kg (221 lb 3.2 oz)   SpO2 96%   BMI 30.85 kg/m²       Objective   Physical Exam  Gen: NAD, alert  Head: normocephalic/atraumatic  Eyes: conjunctivae normal  Ears: canals clear bilaterally, TM normal   Nose: external nose normal   Oropharynx: clear   Resp: Clear to auscultation  CVS: Regular rate and rhythm  Abdomen: soft, NT, ND  : erythematous rash in between legs and groin area as well under scrotum  Ext: no edema, NT of lower extremities  Neuro: gait normal     Assessment/Plan   Problem List Items Addressed This Visit       Borderline hyperglycemia - Primary    Relevant Orders    POCT glycosylated hemoglobin (Hb A1C) manually resulted (Completed)     Other Visit Diagnoses       Tinea cruris        Relevant Medications    nystatin (Mycostatin) 100,000 unit/gram powder    fluconazole (Diflucan) 100 mg tablet    Tinea pedis of both feet        Relevant Medications    ketoconazole (NIZOral) 2 % cream    Need for influenza vaccination        Relevant Orders    Flu vaccine, trivalent, preservative free, HIGH-DOSE, age 65y+ (Fluzone) (Completed)               "

## 2024-10-16 ENCOUNTER — APPOINTMENT (OUTPATIENT)
Dept: CARDIOLOGY | Facility: HOSPITAL | Age: 69
End: 2024-10-16
Payer: MEDICARE

## 2024-10-16 ENCOUNTER — HOSPITAL ENCOUNTER (EMERGENCY)
Facility: HOSPITAL | Age: 69
Discharge: HOME | End: 2024-10-16
Attending: FAMILY MEDICINE
Payer: MEDICARE

## 2024-10-16 ENCOUNTER — APPOINTMENT (OUTPATIENT)
Dept: RADIOLOGY | Facility: HOSPITAL | Age: 69
End: 2024-10-16
Payer: MEDICARE

## 2024-10-16 VITALS
WEIGHT: 220 LBS | BODY MASS INDEX: 30.8 KG/M2 | DIASTOLIC BLOOD PRESSURE: 71 MMHG | OXYGEN SATURATION: 96 % | HEIGHT: 71 IN | SYSTOLIC BLOOD PRESSURE: 155 MMHG | RESPIRATION RATE: 17 BRPM | HEART RATE: 68 BPM | TEMPERATURE: 97.3 F

## 2024-10-16 DIAGNOSIS — G89.29 CHRONIC RIGHT-SIDED LOW BACK PAIN WITH RIGHT-SIDED SCIATICA: ICD-10-CM

## 2024-10-16 DIAGNOSIS — M54.41 CHRONIC RIGHT-SIDED LOW BACK PAIN WITH RIGHT-SIDED SCIATICA: ICD-10-CM

## 2024-10-16 DIAGNOSIS — J98.11 ATELECTASIS: ICD-10-CM

## 2024-10-16 DIAGNOSIS — R07.9 CHEST PAIN, UNSPECIFIED TYPE: Primary | ICD-10-CM

## 2024-10-16 LAB
ALBUMIN SERPL BCP-MCNC: 3.8 G/DL (ref 3.4–5)
ALP SERPL-CCNC: 101 U/L (ref 33–136)
ALT SERPL W P-5'-P-CCNC: 10 U/L (ref 10–52)
ANION GAP SERPL CALC-SCNC: 13 MMOL/L (ref 10–20)
AST SERPL W P-5'-P-CCNC: 16 U/L (ref 9–39)
BASOPHILS # BLD AUTO: 0.12 X10*3/UL (ref 0–0.1)
BASOPHILS NFR BLD AUTO: 0.9 %
BILIRUB SERPL-MCNC: 0.4 MG/DL (ref 0–1.2)
BUN SERPL-MCNC: 12 MG/DL (ref 6–23)
CALCIUM SERPL-MCNC: 8.6 MG/DL (ref 8.6–10.3)
CARDIAC TROPONIN I PNL SERPL HS: 5 NG/L (ref 0–20)
CHLORIDE SERPL-SCNC: 107 MMOL/L (ref 98–107)
CO2 SERPL-SCNC: 24 MMOL/L (ref 21–32)
CREAT SERPL-MCNC: 1.26 MG/DL (ref 0.5–1.3)
D DIMER PPP FEU-MCNC: 1977 NG/ML FEU
EGFRCR SERPLBLD CKD-EPI 2021: 62 ML/MIN/1.73M*2
EOSINOPHIL # BLD AUTO: 0.67 X10*3/UL (ref 0–0.7)
EOSINOPHIL NFR BLD AUTO: 5.1 %
ERYTHROCYTE [DISTWIDTH] IN BLOOD BY AUTOMATED COUNT: 14.6 % (ref 11.5–14.5)
GLUCOSE SERPL-MCNC: 149 MG/DL (ref 74–99)
HCT VFR BLD AUTO: 41 % (ref 41–52)
HGB BLD-MCNC: 13.8 G/DL (ref 13.5–17.5)
IMM GRANULOCYTES # BLD AUTO: 0.06 X10*3/UL (ref 0–0.7)
IMM GRANULOCYTES NFR BLD AUTO: 0.5 % (ref 0–0.9)
LYMPHOCYTES # BLD AUTO: 2.49 X10*3/UL (ref 1.2–4.8)
LYMPHOCYTES NFR BLD AUTO: 18.9 %
MCH RBC QN AUTO: 28.5 PG (ref 26–34)
MCHC RBC AUTO-ENTMCNC: 33.7 G/DL (ref 32–36)
MCV RBC AUTO: 85 FL (ref 80–100)
MONOCYTES # BLD AUTO: 0.89 X10*3/UL (ref 0.1–1)
MONOCYTES NFR BLD AUTO: 6.8 %
NEUTROPHILS # BLD AUTO: 8.92 X10*3/UL (ref 1.2–7.7)
NEUTROPHILS NFR BLD AUTO: 67.8 %
NRBC BLD-RTO: 0 /100 WBCS (ref 0–0)
PLATELET # BLD AUTO: 325 X10*3/UL (ref 150–450)
POTASSIUM SERPL-SCNC: 3.8 MMOL/L (ref 3.5–5.3)
PROT SERPL-MCNC: 7 G/DL (ref 6.4–8.2)
RBC # BLD AUTO: 4.85 X10*6/UL (ref 4.5–5.9)
SODIUM SERPL-SCNC: 140 MMOL/L (ref 136–145)
WBC # BLD AUTO: 13.2 X10*3/UL (ref 4.4–11.3)

## 2024-10-16 PROCEDURE — 71101 X-RAY EXAM UNILAT RIBS/CHEST: CPT | Mod: RIGHT SIDE | Performed by: STUDENT IN AN ORGANIZED HEALTH CARE EDUCATION/TRAINING PROGRAM

## 2024-10-16 PROCEDURE — 84075 ASSAY ALKALINE PHOSPHATASE: CPT | Performed by: FAMILY MEDICINE

## 2024-10-16 PROCEDURE — 71101 X-RAY EXAM UNILAT RIBS/CHEST: CPT | Mod: RT

## 2024-10-16 PROCEDURE — 84484 ASSAY OF TROPONIN QUANT: CPT | Performed by: FAMILY MEDICINE

## 2024-10-16 PROCEDURE — 94640 AIRWAY INHALATION TREATMENT: CPT

## 2024-10-16 PROCEDURE — 2500000002 HC RX 250 W HCPCS SELF ADMINISTERED DRUGS (ALT 637 FOR MEDICARE OP, ALT 636 FOR OP/ED): Mod: MUE | Performed by: FAMILY MEDICINE

## 2024-10-16 PROCEDURE — 96374 THER/PROPH/DIAG INJ IV PUSH: CPT

## 2024-10-16 PROCEDURE — 99285 EMERGENCY DEPT VISIT HI MDM: CPT

## 2024-10-16 PROCEDURE — 85025 COMPLETE CBC W/AUTO DIFF WBC: CPT | Performed by: FAMILY MEDICINE

## 2024-10-16 PROCEDURE — 2550000001 HC RX 255 CONTRASTS: Performed by: FAMILY MEDICINE

## 2024-10-16 PROCEDURE — 36415 COLL VENOUS BLD VENIPUNCTURE: CPT | Performed by: FAMILY MEDICINE

## 2024-10-16 PROCEDURE — 85379 FIBRIN DEGRADATION QUANT: CPT | Performed by: FAMILY MEDICINE

## 2024-10-16 PROCEDURE — 71275 CT ANGIOGRAPHY CHEST: CPT | Performed by: STUDENT IN AN ORGANIZED HEALTH CARE EDUCATION/TRAINING PROGRAM

## 2024-10-16 PROCEDURE — 71275 CT ANGIOGRAPHY CHEST: CPT

## 2024-10-16 PROCEDURE — 2500000004 HC RX 250 GENERAL PHARMACY W/ HCPCS (ALT 636 FOR OP/ED): Performed by: FAMILY MEDICINE

## 2024-10-16 PROCEDURE — 93005 ELECTROCARDIOGRAM TRACING: CPT

## 2024-10-16 RX ORDER — IPRATROPIUM BROMIDE AND ALBUTEROL SULFATE 2.5; .5 MG/3ML; MG/3ML
3 SOLUTION RESPIRATORY (INHALATION) ONCE
Status: COMPLETED | OUTPATIENT
Start: 2024-10-16 | End: 2024-10-16

## 2024-10-16 RX ORDER — KETOROLAC TROMETHAMINE 15 MG/ML
15 INJECTION, SOLUTION INTRAMUSCULAR; INTRAVENOUS ONCE
Status: COMPLETED | OUTPATIENT
Start: 2024-10-16 | End: 2024-10-16

## 2024-10-16 RX ORDER — METHYLPREDNISOLONE 4 MG/1
TABLET ORAL
Qty: 21 TABLET | Refills: 0 | Status: SHIPPED | OUTPATIENT
Start: 2024-10-16 | End: 2024-10-23

## 2024-10-16 RX ORDER — LIDOCAINE 50 MG/G
1 PATCH TOPICAL DAILY
Qty: 10 PATCH | Refills: 0 | Status: SHIPPED | OUTPATIENT
Start: 2024-10-16

## 2024-10-16 ASSESSMENT — PAIN - FUNCTIONAL ASSESSMENT: PAIN_FUNCTIONAL_ASSESSMENT: 0-10

## 2024-10-16 ASSESSMENT — PAIN DESCRIPTION - PAIN TYPE: TYPE: ACUTE PAIN

## 2024-10-16 ASSESSMENT — PAIN DESCRIPTION - LOCATION: LOCATION: CHEST

## 2024-10-16 ASSESSMENT — PAIN DESCRIPTION - ORIENTATION: ORIENTATION: RIGHT

## 2024-10-16 ASSESSMENT — PAIN SCALES - GENERAL
PAINLEVEL_OUTOF10: 8
PAINLEVEL_OUTOF10: 5 - MODERATE PAIN

## 2024-10-16 ASSESSMENT — PAIN DESCRIPTION - FREQUENCY: FREQUENCY: CONSTANT/CONTINUOUS

## 2024-10-17 ASSESSMENT — HEART SCORE
HEART SCORE: 3
TROPONIN: LESS THAN OR EQUAL TO NORMAL LIMIT
HISTORY: SLIGHTLY SUSPICIOUS
AGE: 65+
ECG: NORMAL
RISK FACTORS: 1-2 RISK FACTORS

## 2024-10-17 NOTE — ED PROVIDER NOTES
HPI   Chief Complaint   Patient presents with    Chest Pain     Chest pain on the right side of his chest when he breathes deeply for two weeks.        69-year-old male comes ED with complaint of right sided chest pain with deep breathing has been going on consistently for the past month.  Patient reports symptoms of continue to persist and feels it was more escalated the last 2 weeks and came in today to have it evaluated.  Patient reports he is taken nothing to treat it thus far and it continues to persist.  Patient reports no other associate symptoms or complaints.  Patient in the ED is alert, cooperative, appears anxious, uncomfortable, but in no distress.  Patient also adds that he has had some recurrent aggravation of his chronic low back pain and brought about because not take anything thus far to treat it the last couple days and requesting something to manage it while he is having his chest pain evaluated.      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    Neuralgia and neuritis, unspecified     Neuritis    Nontoxic single thyroid nodule     Solitary thyroid nodule    Other specified anxiety disorders     Depression with anxiety    Personal history of benign carcinoid tumor 12/16/2015    History of benign carcinoid tumor     Past Surgical History:   Procedure Laterality Date    COLONOSCOPY  05/20/2013    Complete Colonoscopy    OTHER SURGICAL HISTORY  03/18/2013    Excision Of Burn Tissue Shoulder    OTHER SURGICAL HISTORY   03/04/2021    Pancreatic surgery    SHOULDER SURGERY  04/01/2013    Shoulder Surgery    SMALL INTESTINE SURGERY  06/30/2017    Small Bowel Resection     Family History   Problem Relation Name Age of Onset    Lung cancer Mother      Heart failure Father       Social History     Tobacco Use    Smoking status: Every Day     Current packs/day: 1.00     Average packs/day: 1 pack/day for 40.0 years (40.0 ttl pk-yrs)     Types: Cigarettes    Smokeless tobacco: Never   Substance Use Topics    Alcohol use: Not Currently    Drug use: Never       Physical Exam   ED Triage Vitals [10/16/24 1824]   Temperature Heart Rate Respirations BP   36.3 °C (97.3 °F) 74 16 142/85      Pulse Ox Temp Source Heart Rate Source Patient Position   97 % Temporal Monitor Sitting      BP Location FiO2 (%)     Left arm --       Physical Exam  Vitals and nursing note reviewed.   Constitutional:       General: He is not in acute distress.     Appearance: He is well-developed.   HENT:      Head: Normocephalic and atraumatic.   Eyes:      Conjunctiva/sclera: Conjunctivae normal.   Cardiovascular:      Rate and Rhythm: Normal rate and regular rhythm.      Pulses: Normal pulses.      Heart sounds: Normal heart sounds, S1 normal and S2 normal. No murmur heard.  Pulmonary:      Effort: Pulmonary effort is normal. No tachypnea or respiratory distress.      Breath sounds: Normal breath sounds and air entry.   Chest:      Chest wall: No mass, lacerations, deformity, swelling, tenderness, crepitus or edema. There is no dullness to percussion.   Abdominal:      Palpations: Abdomen is soft.      Tenderness: There is no abdominal tenderness.   Musculoskeletal:         General: No swelling.      Cervical back: Neck supple.      Thoracic back: Normal.      Lumbar back: Spasms and tenderness present. No swelling, edema, deformity, signs of trauma, lacerations or bony tenderness. Normal range of motion. Negative right straight leg raise test and negative left straight  leg raise test. No scoliosis.        Back:       Comments: Pain along the paraspinal muscles in the lower back on the right side aggravated with flexion/extension exhibiting some sciatica  No findings of bruising/swelling/redness   Skin:     General: Skin is warm and dry.      Capillary Refill: Capillary refill takes less than 2 seconds.   Neurological:      General: No focal deficit present.      Mental Status: He is alert and oriented to person, place, and time.      GCS: GCS eye subscore is 4. GCS verbal subscore is 5. GCS motor subscore is 6.   Psychiatric:         Mood and Affect: Mood normal.           ED Course & MDM   Diagnoses as of 10/17/24 0212   Chest pain, unspecified type   Atelectasis   Chronic right-sided low back pain with right-sided sciatica                 No data recorded                             Labs Reviewed   CBC WITH AUTO DIFFERENTIAL - Abnormal       Result Value    WBC 13.2 (*)     nRBC 0.0      RBC 4.85      Hemoglobin 13.8      Hematocrit 41.0      MCV 85      MCH 28.5      MCHC 33.7      RDW 14.6 (*)     Platelets 325      Neutrophils % 67.8      Immature Granulocytes %, Automated 0.5      Lymphocytes % 18.9      Monocytes % 6.8      Eosinophils % 5.1      Basophils % 0.9      Neutrophils Absolute 8.92 (*)     Immature Granulocytes Absolute, Automated 0.06      Lymphocytes Absolute 2.49      Monocytes Absolute 0.89      Eosinophils Absolute 0.67      Basophils Absolute 0.12 (*)    COMPREHENSIVE METABOLIC PANEL - Abnormal    Glucose 149 (*)     Sodium 140      Potassium 3.8      Chloride 107      Bicarbonate 24      Anion Gap 13      Urea Nitrogen 12      Creatinine 1.26      eGFR 62      Calcium 8.6      Albumin 3.8      Alkaline Phosphatase 101      Total Protein 7.0      AST 16      Bilirubin, Total 0.4      ALT 10     D-DIMER, VTE EXCLUSION - Abnormal    D-Dimer, Quantitative VTE Exclusion 1,977 (*)     Narrative:     The VTE Exclusion D-Dimer assay is reported in ng/mL Fibrinogen  Equivalent Units (FEU).    Per 's instructions for use, a value of less than 500 ng/mL (FEU) may help to exclude DVT or PE in outpatients when the assay is used with a clinical pretest probability assessment.(AE must utilize and document eCalc 'Wells Score Deep Vein Thrombosis Risk' for DVT exclusion only. Emergency Department should utilize  Guidelines for Emergency Department Use of the VTE Exclusion D-Dimer and Clinical Pretest probability assessment model for DVT or PE exclusion.)   TROPONIN I, HIGH SENSITIVITY - Normal    Troponin I, High Sensitivity 5      Narrative:     Less than 99th percentile of normal range cutoff-  Female and children under 18 years old <14 ng/L; Male <21 ng/L: Negative  Repeat testing should be performed if clinically indicated.     Female and children under 18 years old 14-50 ng/L; Male 21-50 ng/L:  Consistent with possible cardiac damage and possible increased clinical   risk. Serial measurements may help to assess extent of myocardial damage.     >50 ng/L: Consistent with cardiac damage, increased clinical risk and  myocardial infarction. Serial measurements may help assess extent of   myocardial damage.      NOTE: Children less than 1 year old may have higher baseline troponin   levels and results should be interpreted in conjunction with the overall   clinical context.     NOTE: Troponin I testing is performed using a different   testing methodology at [facility] than at other   [facility]. Direct result comparisons should only   be made within the same method.     CT angio chest for pulmonary embolism   Final Result   1. No evidence of acute pulmonary embolism.   2. Mild upper lobe predominant paraseptal and centrilobular   emphysematous changes with some atelectasis in the lower lobes   bilaterally. No consolidation or pleural effusion is present.   3. Focus of subpleural round atelectasis in the inferolateral aspect   of the right lower lobe  is somewhat decreased in size from prior exam   in October of 2020.        MACRO:   None        Signed by: Alvarez Wright 10/16/2024 8:49 PM   Dictation workstation:   [id number]      XR ribs right 2 views w chest pa or ap   Final Result   1.  Unremarkable radiographs of the ribs, without evidence of   displaced fracture or aggressive bony process.   2. Atelectatic changes in the lower lobes without evidence of   consolidation or sizable pleural effusion.                  MACRO:   None        Signed by: Alvarez Wright 10/16/2024 8:44 PM   Dictation workstation:   [id number]        1830 -- NSR rate 75.  Normal axis.  Normal intervals.  RBBB, nonspecific ST-T wave changes with no findings of STEMI. Unchanged compared to old EKG      Medical Decision Making  Pt upon arrival to the ED appeared to be anxious and uncomfortable but in no distress with stable vitals.  Discussed with pt the presenting complaints and clinically findings.  Reviewed with pt the epic chart and counseled the patient on chest wall pain with deep breathing and appropriate approach to management/treatments.  After assessment and evaluation IV line started, labs sent, imaging ordered, EKG reviewed, started on aerosol treatment, given IV Toradol, placed on cardiac monitor, and observed.  After treatment and a period of rest patient was reassessed and finally feeling much better, pain had resolved, no new physical complaints, vital signs stable, and preliminary results were reviewed/discussed with patient.  At this time CT PE was put in for assessment of elevated D-dimer however remaining workup results were all negative and with a heart score of 3.  At this time patient is continue to be persistently comfortable with resolution of his complaints and management of his back pain.  Final CT results were reviewed with patient and findings appear to be more consistent with atelectasis with no acute abnormalities.  Patient was given  incentive spirometry in the ED for management and educated the patient on his home medications as well as use of over-the-counter medication for management of pain.  Patient was given prescription for management of back pain and encouraged patient follow-up with primary care doctor for reassessment and recheck of the findings today.  Patient stable and discharged home with family.    Amount and/or Complexity of Data Reviewed  External Data Reviewed: labs, radiology, ECG and notes.  Labs: ordered. Decision-making details documented in ED Course.  Radiology: ordered. Decision-making details documented in ED Course.  ECG/medicine tests: ordered and independent interpretation performed. Decision-making details documented in ED Course.    Risk  OTC drugs.  Prescription drug management.        Procedure  Procedures     Josh Burciaga MD  10/17/24 0312

## 2024-10-20 LAB
ATRIAL RATE: 75 BPM
P AXIS: 74 DEGREES
P OFFSET: 177 MS
P ONSET: 121 MS
PR INTERVAL: 188 MS
Q ONSET: 215 MS
QRS COUNT: 12 BEATS
QRS DURATION: 142 MS
QT INTERVAL: 442 MS
QTC CALCULATION(BAZETT): 493 MS
QTC FREDERICIA: 475 MS
R AXIS: 18 DEGREES
T AXIS: 45 DEGREES
T OFFSET: 436 MS
VENTRICULAR RATE: 75 BPM

## 2024-10-23 ENCOUNTER — TELEPHONE (OUTPATIENT)
Dept: PRIMARY CARE | Facility: CLINIC | Age: 69
End: 2024-10-23
Payer: MEDICARE

## 2024-10-23 NOTE — TELEPHONE ENCOUNTER
Having right shoulder pain- asking if we can give him a shot to relax his muscles? I see he has had a Toradol shot in the past. Tried to sleep on a chair last night to make himself more comfortable.     Your next soonest is not till next Wednesday.

## 2024-10-24 ENCOUNTER — OFFICE VISIT (OUTPATIENT)
Dept: PRIMARY CARE | Facility: CLINIC | Age: 69
End: 2024-10-24
Payer: MEDICARE

## 2024-10-24 VITALS
DIASTOLIC BLOOD PRESSURE: 60 MMHG | WEIGHT: 217.6 LBS | HEIGHT: 71 IN | SYSTOLIC BLOOD PRESSURE: 130 MMHG | OXYGEN SATURATION: 93 % | HEART RATE: 84 BPM | BODY MASS INDEX: 30.46 KG/M2 | TEMPERATURE: 97.8 F

## 2024-10-24 DIAGNOSIS — S46.911A STRAIN OF RIGHT SHOULDER, INITIAL ENCOUNTER: Primary | ICD-10-CM

## 2024-10-24 DIAGNOSIS — B35.6 TINEA CRURIS: ICD-10-CM

## 2024-10-24 DIAGNOSIS — L03.818 CELLULITIS OF OTHER SPECIFIED SITE: ICD-10-CM

## 2024-10-24 PROCEDURE — 99214 OFFICE O/P EST MOD 30 MIN: CPT | Performed by: FAMILY MEDICINE

## 2024-10-24 PROCEDURE — 1159F MED LIST DOCD IN RCRD: CPT | Performed by: FAMILY MEDICINE

## 2024-10-24 PROCEDURE — 3075F SYST BP GE 130 - 139MM HG: CPT | Performed by: FAMILY MEDICINE

## 2024-10-24 PROCEDURE — 96372 THER/PROPH/DIAG INJ SC/IM: CPT | Performed by: FAMILY MEDICINE

## 2024-10-24 PROCEDURE — 3078F DIAST BP <80 MM HG: CPT | Performed by: FAMILY MEDICINE

## 2024-10-24 PROCEDURE — 3008F BODY MASS INDEX DOCD: CPT | Performed by: FAMILY MEDICINE

## 2024-10-24 RX ORDER — FLUCONAZOLE 100 MG/1
100 TABLET ORAL WEEKLY
Qty: 4 TABLET | Refills: 0 | Status: SHIPPED | OUTPATIENT
Start: 2024-10-24

## 2024-10-24 RX ORDER — KETOROLAC TROMETHAMINE 30 MG/ML
30 INJECTION, SOLUTION INTRAMUSCULAR; INTRAVENOUS ONCE
Status: COMPLETED | OUTPATIENT
Start: 2024-10-24 | End: 2024-10-24

## 2024-10-24 RX ORDER — PREDNISONE 20 MG/1
TABLET ORAL
Qty: 18 TABLET | Refills: 0 | Status: SHIPPED | OUTPATIENT
Start: 2024-10-24

## 2024-10-24 RX ORDER — SULFAMETHOXAZOLE AND TRIMETHOPRIM 800; 160 MG/1; MG/1
1 TABLET ORAL 2 TIMES DAILY
Qty: 20 TABLET | Refills: 0 | Status: SHIPPED | OUTPATIENT
Start: 2024-10-24 | End: 2024-11-03

## 2024-10-24 NOTE — PROGRESS NOTES
"Subjective   Patient ID: Juanjo Alexis is a 69 y.o. male who presents for Shoulder Pain (Right. Ongoing pain. Been sleeping in chair. Intense pain. /Discuss simvastatin, fluconazole )    HPI  Here for urgent visit  Has been having right shoulder pain x 4 days, started after he was fixing a car. Pain so bad can hardly sleep in bed, has been sleeping in a chair. No falls.   Still having tinea cruris, been using nystatin powder and ketoconazole. Advised to stop, will switch to diflucan.     Review of Systems  General: no fever  Eyes: no blurry vision  ENT: no sore throat, no ear pain  Resp: no cough, sob or wheezing  Cardio: no chest pain, no palpitations  Abd: no nausea/vomiting  : no dysuria, no increased urinary frequency      /60   Pulse 84   Temp 36.6 °C (97.8 °F)   Ht 1.803 m (5' 11\")   Wt 98.7 kg (217 lb 9.6 oz)   SpO2 93%   BMI 30.35 kg/m²       Objective   Physical Exam  Gen: NAD, alert  Head: normocephalic/atraumatic  Eyes: conjunctivae normal  Ears: canals clear bilaterally, TM normal   Nose: external nose normal   Oropharynx: clear   Resp: Clear to auscultation  CVS: Regular rate and rhythm  Abdomen: soft, NT, ND  Ext: no edema, NT of lower extremities, limited ROM of right shoulder  Gu: erythematous rash in between thigh and groin area    Assessment/Plan   Problem List Items Addressed This Visit    None  Visit Diagnoses       Strain of right shoulder, initial encounter    -  Primary    Relevant Medications    predniSONE (Deltasone) 20 mg tablet    ketorolac (Toradol) injection 30 mg (Completed)    Cellulitis of other specified site        Relevant Medications    sulfamethoxazole-trimethoprim (Bactrim DS) 800-160 mg tablet    Tinea cruris        Relevant Medications    fluconazole (Diflucan) 100 mg tablet               "

## 2024-10-31 DIAGNOSIS — F32.1 MODERATE MAJOR DEPRESSION (MULTI): ICD-10-CM

## 2024-10-31 DIAGNOSIS — R21 RASH AND OTHER NONSPECIFIC SKIN ERUPTION: ICD-10-CM

## 2024-10-31 DIAGNOSIS — S46.911A STRAIN OF RIGHT SHOULDER, INITIAL ENCOUNTER: ICD-10-CM

## 2024-10-31 DIAGNOSIS — B35.3 TINEA PEDIS OF BOTH FEET: ICD-10-CM

## 2024-10-31 DIAGNOSIS — B35.6 TINEA CRURIS: ICD-10-CM

## 2024-11-01 RX ORDER — PREDNISONE 20 MG/1
TABLET ORAL
Qty: 18 TABLET | Refills: 0 | OUTPATIENT
Start: 2024-11-01

## 2024-11-01 RX ORDER — KETOCONAZOLE 20 MG/G
CREAM TOPICAL DAILY
Qty: 60 G | Refills: 0 | OUTPATIENT
Start: 2024-11-01

## 2024-11-01 RX ORDER — NYSTATIN TOPICAL POWDER 100000 U/G
POWDER TOPICAL 2 TIMES DAILY
Qty: 60 G | Refills: 0 | OUTPATIENT
Start: 2024-11-01

## 2024-11-01 RX ORDER — CLOTRIMAZOLE AND BETAMETHASONE DIPROPIONATE 10; .64 MG/G; MG/G
CREAM TOPICAL
Qty: 45 G | Refills: 1 | OUTPATIENT
Start: 2024-11-01

## 2024-11-01 RX ORDER — DULOXETIN HYDROCHLORIDE 60 MG/1
CAPSULE, DELAYED RELEASE ORAL
Qty: 90 CAPSULE | Refills: 1 | Status: SHIPPED | OUTPATIENT
Start: 2024-11-01

## 2024-11-25 ENCOUNTER — OFFICE VISIT (OUTPATIENT)
Dept: SURGERY | Facility: CLINIC | Age: 69
End: 2024-11-25
Payer: MEDICARE

## 2024-11-25 VITALS
SYSTOLIC BLOOD PRESSURE: 129 MMHG | HEIGHT: 72 IN | HEART RATE: 72 BPM | WEIGHT: 214 LBS | TEMPERATURE: 98.4 F | DIASTOLIC BLOOD PRESSURE: 67 MMHG | BODY MASS INDEX: 28.99 KG/M2

## 2024-11-25 DIAGNOSIS — R21 RASH: ICD-10-CM

## 2024-11-25 DIAGNOSIS — R21 GROIN RASH: Primary | ICD-10-CM

## 2024-11-25 DIAGNOSIS — L02.01 ABSCESS OF FACE: ICD-10-CM

## 2024-11-25 PROCEDURE — 99213 OFFICE O/P EST LOW 20 MIN: CPT | Performed by: SURGERY

## 2024-11-25 PROCEDURE — 1159F MED LIST DOCD IN RCRD: CPT | Performed by: SURGERY

## 2024-11-25 PROCEDURE — 4004F PT TOBACCO SCREEN RCVD TLK: CPT | Performed by: SURGERY

## 2024-11-25 PROCEDURE — 3078F DIAST BP <80 MM HG: CPT | Performed by: SURGERY

## 2024-11-25 PROCEDURE — 1160F RVW MEDS BY RX/DR IN RCRD: CPT | Performed by: SURGERY

## 2024-11-25 PROCEDURE — 3008F BODY MASS INDEX DOCD: CPT | Performed by: SURGERY

## 2024-11-25 PROCEDURE — 3074F SYST BP LT 130 MM HG: CPT | Performed by: SURGERY

## 2024-11-25 RX ORDER — DOXYCYCLINE 100 MG/1
100 CAPSULE ORAL 2 TIMES DAILY
Qty: 42 CAPSULE | Refills: 0 | Status: SHIPPED | OUTPATIENT
Start: 2024-11-25 | End: 2024-12-16

## 2024-11-25 ASSESSMENT — ENCOUNTER SYMPTOMS: WOUND: 1

## 2024-11-25 NOTE — PROGRESS NOTES
Patient ID: Juanjo Alexis is a 69 y.o. male.  Presenting for persistent bilateral groin rash and left nasolabial abscess    Subjective   HPI  This a patient with a chronic groin rash of both groins.  Has had persistent issues with this.  Has been treated with multiple medications to no real effect.  He has small draining wound in the left side.  He also has a small nasolabial lesion that is just ruptured.  The patient has tried topical nystatin, oral fluconazole and  Review of Systems   Skin:  Positive for wound.       Objective   Physical Exam  Skin:     Comments: Bilateral groin rash extending laterally and medially.  Small nasolabial abscess.         Problem List Items Addressed This Visit       Groin rash - Primary    Abscess of face        Assessment/Plan   Plan-start oral doxycycline.  Recommend starting Pinxav ointment or topical Desitin.  Apply liberally to both groins.  We will get you an appointment with a dermatologist that sees skin rashes.  Continue to follow facial abscess conservatively.

## 2024-11-25 NOTE — Clinical Note
Vicente -I will get him an appointment with Olivier Burgos in [place] , at this time you have tried everything so we will go back to the basics and give him some topical zinc oxide ointment to help heal the groin.  I also put him on some oral doxycycline for his face and the left groin chronic wound.  Difficult case

## 2024-11-25 NOTE — PATIENT INSTRUCTIONS
At this time I would stop all the other medications you are taking.  I have ordered you oral doxycycline that I want you to take for 3 weeks.  This will help the groin and the face.  I want you to start oral Desitin ointment or Pinxav ointment.  We will get you an appoint with a dermatologist that sees patients with rashes.

## 2024-12-05 DIAGNOSIS — B35.3 TINEA PEDIS OF BOTH FEET: ICD-10-CM

## 2024-12-05 DIAGNOSIS — S46.911A STRAIN OF RIGHT SHOULDER, INITIAL ENCOUNTER: ICD-10-CM

## 2024-12-05 DIAGNOSIS — R21 RASH AND OTHER NONSPECIFIC SKIN ERUPTION: ICD-10-CM

## 2024-12-05 DIAGNOSIS — L02.01 ABSCESS OF FACE: ICD-10-CM

## 2024-12-05 DIAGNOSIS — B35.6 TINEA CRURIS: ICD-10-CM

## 2024-12-05 DIAGNOSIS — E53.8 DEFICIENCY OF OTHER SPECIFIED B GROUP VITAMINS: ICD-10-CM

## 2024-12-05 DIAGNOSIS — R21 GROIN RASH: ICD-10-CM

## 2024-12-05 RX ORDER — KETOCONAZOLE 20 MG/G
CREAM TOPICAL DAILY
Qty: 60 G | Refills: 0 | OUTPATIENT
Start: 2024-12-05

## 2024-12-05 RX ORDER — CYANOCOBALAMIN 1000 UG/ML
INJECTION, SOLUTION INTRAMUSCULAR; SUBCUTANEOUS
Qty: 3 ML | Refills: 3 | Status: SHIPPED | OUTPATIENT
Start: 2024-12-05

## 2024-12-05 RX ORDER — CLOTRIMAZOLE AND BETAMETHASONE DIPROPIONATE 10; .64 MG/G; MG/G
CREAM TOPICAL
Qty: 45 G | Refills: 1 | OUTPATIENT
Start: 2024-12-05

## 2024-12-05 RX ORDER — PREDNISONE 20 MG/1
TABLET ORAL
Qty: 18 TABLET | Refills: 0 | OUTPATIENT
Start: 2024-12-05

## 2024-12-05 RX ORDER — FLUCONAZOLE 100 MG/1
TABLET ORAL
Qty: 4 TABLET | Refills: 0 | OUTPATIENT
Start: 2024-12-05

## 2024-12-05 RX ORDER — NYSTATIN TOPICAL POWDER 100000 U/G
POWDER TOPICAL 2 TIMES DAILY
Qty: 60 G | Refills: 0 | OUTPATIENT
Start: 2024-12-05

## 2024-12-06 RX ORDER — DOXYCYCLINE 100 MG/1
CAPSULE ORAL
Qty: 42 CAPSULE | Refills: 0 | Status: SHIPPED | OUTPATIENT
Start: 2024-12-06

## 2025-01-06 ENCOUNTER — APPOINTMENT (OUTPATIENT)
Dept: PULMONOLOGY | Facility: CLINIC | Age: 70
End: 2025-01-06
Payer: MEDICARE

## 2025-01-06 VITALS
OXYGEN SATURATION: 94 % | HEART RATE: 60 BPM | WEIGHT: 210 LBS | DIASTOLIC BLOOD PRESSURE: 78 MMHG | SYSTOLIC BLOOD PRESSURE: 137 MMHG | BODY MASS INDEX: 28.48 KG/M2

## 2025-01-06 DIAGNOSIS — F17.210 CIGARETTE NICOTINE DEPENDENCE WITHOUT COMPLICATION: Primary | ICD-10-CM

## 2025-01-06 DIAGNOSIS — G47.33 OBSTRUCTIVE SLEEP APNEA: ICD-10-CM

## 2025-01-06 DIAGNOSIS — J44.9 CHRONIC OBSTRUCTIVE PULMONARY DISEASE, UNSPECIFIED COPD TYPE (MULTI): ICD-10-CM

## 2025-01-06 PROCEDURE — 3075F SYST BP GE 130 - 139MM HG: CPT | Performed by: PEDIATRICS

## 2025-01-06 PROCEDURE — 3078F DIAST BP <80 MM HG: CPT | Performed by: PEDIATRICS

## 2025-01-06 PROCEDURE — 1159F MED LIST DOCD IN RCRD: CPT | Performed by: PEDIATRICS

## 2025-01-06 PROCEDURE — 99215 OFFICE O/P EST HI 40 MIN: CPT | Performed by: PEDIATRICS

## 2025-01-06 PROCEDURE — 1160F RVW MEDS BY RX/DR IN RCRD: CPT | Performed by: PEDIATRICS

## 2025-01-06 ASSESSMENT — PATIENT HEALTH QUESTIONNAIRE - PHQ9
10. IF YOU CHECKED OFF ANY PROBLEMS, HOW DIFFICULT HAVE THESE PROBLEMS MADE IT FOR YOU TO DO YOUR WORK, TAKE CARE OF THINGS AT HOME, OR GET ALONG WITH OTHER PEOPLE: SOMEWHAT DIFFICULT
3. TROUBLE FALLING OR STAYING ASLEEP OR SLEEPING TOO MUCH: NOT AT ALL
SUM OF ALL RESPONSES TO PHQ QUESTIONS 1-9: 7
5. POOR APPETITE OR OVEREATING: NOT AT ALL
6. FEELING BAD ABOUT YOURSELF - OR THAT YOU ARE A FAILURE OR HAVE LET YOURSELF OR YOUR FAMILY DOWN: NOT AT ALL
SUM OF ALL RESPONSES TO PHQ9 QUESTIONS 1 AND 2: 5
4. FEELING TIRED OR HAVING LITTLE ENERGY: MORE THAN HALF THE DAYS
7. TROUBLE CONCENTRATING ON THINGS, SUCH AS READING THE NEWSPAPER OR WATCHING TELEVISION: NOT AT ALL
2. FEELING DOWN, DEPRESSED OR HOPELESS: NEARLY EVERY DAY
1. LITTLE INTEREST OR PLEASURE IN DOING THINGS: MORE THAN HALF THE DAYS
8. MOVING OR SPEAKING SO SLOWLY THAT OTHER PEOPLE COULD HAVE NOTICED. OR THE OPPOSITE, BEING SO FIGETY OR RESTLESS THAT YOU HAVE BEEN MOVING AROUND A LOT MORE THAN USUAL: NOT AT ALL
9. THOUGHTS THAT YOU WOULD BE BETTER OFF DEAD, OR OF HURTING YOURSELF: NOT AT ALL

## 2025-01-06 NOTE — PROGRESS NOTES
"Subjective   Patient ID: Juanjo Alexis is a 69 y.o. male who presents for COPD, SUNITHA    HPI    Patient initially evaluated in clinic on 03/22/2017. Initial clinic visit note:   \"At baseline, he has dyspnea on exertion, but none at rest. His symptoms started many years ago. His dyspnea has been stable over the last several months. He currently sits for most of the day, and does not carry loads and do strenuous exercise. He is short of breath when hurrying on level ground or walking up a slight hill (mMRC 1). His CAT score is 19. He also denies orthopnea, pnd, or liz. His weight has been stable. He also relates a chronic cough, with clear sputum, with occasional wheezing but no fever or shivering chills. He denies any hemoptysis or chest pain.\"     05/03/2017:On Monday 05/01, he started having increased shortness of breath, cough with green sputum (no hemoptysis) and a febrile episode at 101.5. A sputum sample was sent w Hemophilus influenza multisensitive. He completed a course of Levaquin and prednisone.      6/14/2017: Early june he started complaining of worsening cough and sputum and went to the ED when he got a course of abx and prednisone. Today his breathing is at baseline. He has been active outdoors with minimal limitations. His CAT is 23. He continues to smoke (1.5PPD). He did relate a runny nose and tingling sensation in the back of his throat that makes him cough.     10/11/2017: Since the last visit, he couldn't get coverage for any LAMA inhalers. Still on Symbicort and prn albuterol. They lost electricity at home 2/2 bills. He is not able to use his CPAP or nebulizers. He presented to the ED with right sided chest and shoulder pain. No ACS. Today, breathing is at baseline. Denies any increased cough, sputum, change in the characteristic of his sputum. No f/c/ns. CAT of 22. Patient has been compliant with inhalers (symbicort), using rescue inhaler everyday. ESS 10     07/09/2018: Since the last visit, he " has not needed an abx or prednisone. He moved in with his daughter, is back on his CPAP. Still smoking. Uses his symbicort and prn abluterol. Does not use his nebulizer. He has noticed over the last week increase in his cough, and sputum, feels more congested. No f/c/ns.     01/28/2019 -> At his last visit, he completed a course of prednisone and antibiotics for AECOPD and his breathing returned to baseline. Denies any new cough, sputum, f/c/ns today. Compliant with his symbicort and only using his rescue ProAir every few days. Also compliant with his CPAP machine (continues to live with his daughter). He got a membership to the [employer] but have been inconsistent. Continues to smoke 2 PPD. Had CT chest, PFTs and 6MWT done (results below).      1/5/2022: Mr Alexis is doing ok. He was seen in the ER in early December with flu-like illness which he is recovered from. He is feeling at baseline. He is past due for low dose lung screening. He is not interested in any treatment for SUNITHA at this time. He is using incruse and symbicort every day with albuterol as needed     10/5/2022: Mr Alexis had a COPD exacerbation he was treated with prednisone and doxycycline and is feeling better now. He ran out of trelegy for a couple of days but got a refill from PCP.      2/1/2023: Mr Alexis is about the same. he has good days and bad days. He feels he is deconditioned due to inactivity. He is using trelegy and albuterol (1-2 times a day). Stairs are his biggest issue.      1/9/2024:  Mr Alexis is about the same, he is using trelegy and albuterol.  He had a chest CT recently that showed no nodules, mild-moderate emphysema  He is still having issues with sleep apnea but he states he cannot tolerate PAP therapy I offered an appointment with our sleep specialist.  He will think about it and call if he wants to see her.  He did PFT in April which shows moderate obstruction, air trapping and bronchodilator response.     1/6/2024:  Mr Alexis is  doing well.  He is using trelegy and only needs albuterol occasionally. He was due to a low dose screening CT but ended up in the ER in October so got an angio CT then.  That shows stable emphysema and no lung nodules            Previous pulmonary history:   He has no history of recurrent infections, or lung disease as a child. He was previously diagnosed with COPD many years ago. He currently is on no supplemental oxygen.  He current inhaler regimen is Symbicort and prn albuterol. He has never been to pulmonary rehab.     Hospitalization History:  He has not been hospitalized over the last year.     Sleep history:  Is known to have sleep apnea. Compliant with his CPAP.  Cleveland score (2016) is 8.  Cleveland score (2016) is 5.     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  Was also in the [employer].     Family History:  No family history of lung diseases or cancer.     Imaging history: (I have personally reviewed the imaging below)  10/16/2024:  mild-moderate emphysema, basilar atelectasis vs scar, no nodules  2019-> no new nodules  2017 -> CT scan with resolution of lung nodule. No new findings  2017- >CT chest with paraseptal and centrilobular mostly in the upper lobes. 6mm nodule in the RUL (new since ) and 4 mm in the LLL     PFTs:   1/3/2024:  FVC 78%, FEV1 63% (78% post BD), FEF 25-75 34% +BD, %, RV/%, DLCO 60%  2019-> Ratio of 0.59/FEV1 2.85L (78%) (no BD response)/FVC 4.83L (99%)/ %/RVtoTLC ratio 0.44/DLCO 69%   FEV1 of 76% with + BD response     6 MWTs:   2019->on RA, 350 m without desaturation. Dawson SpO2 of 99%.   306 meters with no desaturation         Review of  Systems    See scanned documents attached to this note for review of systems, and appropriate scales/scores for this visit.     Objective   Physical Exam  Constitutional:       Appearance: Normal appearance.   HENT:      Head: Normocephalic and atraumatic.      Mouth/Throat:      Pharynx: Oropharynx is clear.   Cardiovascular:      Rate and Rhythm: Normal rate and regular rhythm.      Pulses: Normal pulses.      Heart sounds: Normal heart sounds.   Pulmonary:      Effort: Pulmonary effort is normal.      Breath sounds: Normal breath sounds. No wheezing, rhonchi or rales.   Abdominal:      General: Bowel sounds are normal.      Palpations: Abdomen is soft.   Musculoskeletal:         General: Normal range of motion.   Skin:     General: Skin is warm and dry.   Neurological:      General: No focal deficit present.      Mental Status: He is alert and oriented to person, place, and time.   Psychiatric:         Mood and Affect: Mood normal.         Assessment/Plan     Mr. Alexis is a 69 y.o man, smoker (1-2 PPD for years), being evaluated for COPD.     #COPD:  - continue trelegy   - albuterol as needed  - encouraged walking/exercise  1/6/2025:  continue trelegy and albuterol as needed     #Lung nodules: had CT Dec 2023  - LDCT on/after Dec 13 2024  1/6/2025:  had angio CT in October.  Will be due for LDCT on/after 10/17/2025     #SUNITHA: not interested in therapy at this time     # Smoking cessation:  - encouraged cessation        Follow up Oct 2025 after LDCT       Luis M Damon MD 01/06/25 8:46 AM

## 2025-01-12 DIAGNOSIS — I10 ESSENTIAL (PRIMARY) HYPERTENSION: ICD-10-CM

## 2025-01-12 DIAGNOSIS — M54.12 CERVICAL RADICULOPATHY: ICD-10-CM

## 2025-01-12 DIAGNOSIS — B35.3 TINEA PEDIS OF BOTH FEET: ICD-10-CM

## 2025-01-12 DIAGNOSIS — J44.9 CHRONIC OBSTRUCTIVE PULMONARY DISEASE, UNSPECIFIED: ICD-10-CM

## 2025-01-12 DIAGNOSIS — S46.911A STRAIN OF RIGHT SHOULDER, INITIAL ENCOUNTER: ICD-10-CM

## 2025-01-12 DIAGNOSIS — R21 RASH AND OTHER NONSPECIFIC SKIN ERUPTION: ICD-10-CM

## 2025-01-12 DIAGNOSIS — B35.6 TINEA CRURIS: ICD-10-CM

## 2025-01-13 RX ORDER — PREGABALIN 200 MG/1
200 CAPSULE ORAL 2 TIMES DAILY
Qty: 60 CAPSULE | Refills: 0 | Status: SHIPPED | OUTPATIENT
Start: 2025-01-13

## 2025-01-13 RX ORDER — FLUCONAZOLE 100 MG/1
TABLET ORAL
Qty: 4 TABLET | Refills: 0 | OUTPATIENT
Start: 2025-01-13

## 2025-01-13 RX ORDER — PREDNISONE 20 MG/1
TABLET ORAL
Qty: 18 TABLET | Refills: 0 | OUTPATIENT
Start: 2025-01-13

## 2025-01-13 RX ORDER — CLOTRIMAZOLE AND BETAMETHASONE DIPROPIONATE 10; .64 MG/G; MG/G
CREAM TOPICAL
Qty: 45 G | Refills: 1 | OUTPATIENT
Start: 2025-01-13

## 2025-01-13 RX ORDER — NYSTATIN TOPICAL POWDER 100000 U/G
POWDER TOPICAL 2 TIMES DAILY
Qty: 60 G | Refills: 0 | OUTPATIENT
Start: 2025-01-13

## 2025-01-13 RX ORDER — METOPROLOL TARTRATE 50 MG/1
TABLET ORAL
Qty: 180 TABLET | Refills: 0 | Status: SHIPPED | OUTPATIENT
Start: 2025-01-13

## 2025-01-13 RX ORDER — KETOCONAZOLE 20 MG/G
CREAM TOPICAL DAILY
Qty: 60 G | Refills: 0 | OUTPATIENT
Start: 2025-01-13

## 2025-01-23 ENCOUNTER — APPOINTMENT (OUTPATIENT)
Dept: PRIMARY CARE | Facility: CLINIC | Age: 70
End: 2025-01-23
Payer: MEDICARE

## 2025-01-23 VITALS
BODY MASS INDEX: 27.77 KG/M2 | HEIGHT: 72 IN | WEIGHT: 205 LBS | TEMPERATURE: 97.8 F | OXYGEN SATURATION: 96 % | DIASTOLIC BLOOD PRESSURE: 70 MMHG | HEART RATE: 84 BPM | SYSTOLIC BLOOD PRESSURE: 141 MMHG

## 2025-01-23 DIAGNOSIS — E78.2 MIXED HYPERLIPIDEMIA: ICD-10-CM

## 2025-01-23 DIAGNOSIS — M25.512 CHRONIC PAIN OF BOTH SHOULDERS: ICD-10-CM

## 2025-01-23 DIAGNOSIS — J43.2 CENTRILOBULAR EMPHYSEMA (MULTI): ICD-10-CM

## 2025-01-23 DIAGNOSIS — F31.32 BIPOLAR AFFECTIVE DISORDER, CURRENTLY DEPRESSED, MODERATE (MULTI): ICD-10-CM

## 2025-01-23 DIAGNOSIS — M25.511 CHRONIC PAIN OF BOTH SHOULDERS: ICD-10-CM

## 2025-01-23 DIAGNOSIS — B35.6 TINEA CRURIS: ICD-10-CM

## 2025-01-23 DIAGNOSIS — G89.29 CHRONIC PAIN OF BOTH SHOULDERS: ICD-10-CM

## 2025-01-23 DIAGNOSIS — I48.0 PAROXYSMAL ATRIAL FIBRILLATION (MULTI): ICD-10-CM

## 2025-01-23 DIAGNOSIS — Z00.00 MEDICARE ANNUAL WELLNESS VISIT, SUBSEQUENT: Primary | ICD-10-CM

## 2025-01-23 PROBLEM — L02.01 ABSCESS OF FACE: Status: RESOLVED | Noted: 2024-11-25 | Resolved: 2025-01-23

## 2025-01-23 PROCEDURE — 3077F SYST BP >= 140 MM HG: CPT | Performed by: FAMILY MEDICINE

## 2025-01-23 PROCEDURE — 1159F MED LIST DOCD IN RCRD: CPT | Performed by: FAMILY MEDICINE

## 2025-01-23 PROCEDURE — G0439 PPPS, SUBSEQ VISIT: HCPCS | Performed by: FAMILY MEDICINE

## 2025-01-23 PROCEDURE — 3078F DIAST BP <80 MM HG: CPT | Performed by: FAMILY MEDICINE

## 2025-01-23 PROCEDURE — 1170F FXNL STATUS ASSESSED: CPT | Performed by: FAMILY MEDICINE

## 2025-01-23 PROCEDURE — 3008F BODY MASS INDEX DOCD: CPT | Performed by: FAMILY MEDICINE

## 2025-01-23 PROCEDURE — 96372 THER/PROPH/DIAG INJ SC/IM: CPT | Performed by: FAMILY MEDICINE

## 2025-01-23 RX ORDER — KETOROLAC TROMETHAMINE 30 MG/ML
30 INJECTION, SOLUTION INTRAMUSCULAR; INTRAVENOUS ONCE
Status: COMPLETED | OUTPATIENT
Start: 2025-01-23 | End: 2025-01-23

## 2025-01-23 RX ORDER — FLUCONAZOLE 100 MG/1
100 TABLET ORAL WEEKLY
Qty: 4 TABLET | Refills: 0 | Status: SHIPPED | OUTPATIENT
Start: 2025-01-23

## 2025-01-23 RX ORDER — NYSTATIN 100000 [USP'U]/G
1 POWDER TOPICAL 2 TIMES DAILY
Qty: 60 G | Refills: 0 | Status: SHIPPED | OUTPATIENT
Start: 2025-01-23 | End: 2026-01-23

## 2025-01-23 RX ADMIN — KETOROLAC TROMETHAMINE 30 MG: 30 INJECTION, SOLUTION INTRAMUSCULAR; INTRAVENOUS at 12:50

## 2025-01-23 ASSESSMENT — ENCOUNTER SYMPTOMS
LOSS OF SENSATION IN FEET: 0
OCCASIONAL FEELINGS OF UNSTEADINESS: 1
DEPRESSION: 1

## 2025-01-23 ASSESSMENT — PATIENT HEALTH QUESTIONNAIRE - PHQ9
8. MOVING OR SPEAKING SO SLOWLY THAT OTHER PEOPLE COULD HAVE NOTICED. OR THE OPPOSITE, BEING SO FIGETY OR RESTLESS THAT YOU HAVE BEEN MOVING AROUND A LOT MORE THAN USUAL: NOT AT ALL
SUM OF ALL RESPONSES TO PHQ9 QUESTIONS 1 AND 2: 6
1. LITTLE INTEREST OR PLEASURE IN DOING THINGS: NEARLY EVERY DAY
4. FEELING TIRED OR HAVING LITTLE ENERGY: NEARLY EVERY DAY
6. FEELING BAD ABOUT YOURSELF - OR THAT YOU ARE A FAILURE OR HAVE LET YOURSELF OR YOUR FAMILY DOWN: NOT AT ALL
10. IF YOU CHECKED OFF ANY PROBLEMS, HOW DIFFICULT HAVE THESE PROBLEMS MADE IT FOR YOU TO DO YOUR WORK, TAKE CARE OF THINGS AT HOME, OR GET ALONG WITH OTHER PEOPLE: SOMEWHAT DIFFICULT
2. FEELING DOWN, DEPRESSED OR HOPELESS: NEARLY EVERY DAY
5. POOR APPETITE OR OVEREATING: NOT AT ALL
7. TROUBLE CONCENTRATING ON THINGS, SUCH AS READING THE NEWSPAPER OR WATCHING TELEVISION: NEARLY EVERY DAY
3. TROUBLE FALLING OR STAYING ASLEEP OR SLEEPING TOO MUCH: NOT AT ALL
9. THOUGHTS THAT YOU WOULD BE BETTER OFF DEAD, OR OF HURTING YOURSELF: NOT AT ALL
SUM OF ALL RESPONSES TO PHQ QUESTIONS 1-9: 12

## 2025-01-23 ASSESSMENT — ACTIVITIES OF DAILY LIVING (ADL)
DRESSING: INDEPENDENT
BATHING: INDEPENDENT
TAKING_MEDICATION: INDEPENDENT
MANAGING_FINANCES: INDEPENDENT
GROCERY_SHOPPING: INDEPENDENT
DOING_HOUSEWORK: INDEPENDENT

## 2025-01-23 NOTE — PATIENT INSTRUCTIONS
Starting January 27 2025, all patients require an appointment for lab work:   Please call: [phone] ([phone])  OR walk-in and register on [employer] located at the lab.   You can also call [facility] for any lab billing related questions.

## 2025-01-23 NOTE — PROGRESS NOTES
Subjective   Reason for Visit: Juanjo Alexis is an 69 y.o. male here for a Medicare Wellness visit.     Past Medical, Surgical, and Family History reviewed and updated in chart.    Reviewed all medications by prescribing practitioner or clinical pharmacist (such as prescriptions, OTCs, herbal therapies and supplements) and documented in the medical record.    HPI  Here for medicare annual visit  Has history of A-fib following with cardiology (Dr. Flores)  Rash in his groin not improving, went to dermatology got biopsy, but no results yet. Rash getting more and pruritic and having drainage.   COPD under control, no sob or wheezing  Bipolar depression under control with meds  BP elevated today usually better, compliant with meds  Pain in both his shoulders getting worse, has had it for years, has gotten trigger point injections in the past from PMR.     Patient Care Team:  Lupe Woody MD as PCP - General  Lupe Woody MD as PCP - [employer] PCP     Review of Systems  General: no fever  Eyes: no blurry vision  ENT: no sore throat, no ear pain  Resp: no cough, sob or wheezing  Cardio: no chest pain, no palpitations  Abd: no nausea/vomiting  : no dysuria, no increased urinary frequency    Objective   Vitals:  /70   Pulse 84   Temp 36.6 °C (97.8 °F)   Ht 1.829 m (6')   Wt 93 kg (205 lb)   SpO2 96%   BMI 27.80 kg/m²       Physical Exam  Gen: NAD, alert  Head: normocephalic/atraumatic  Eyes: conjunctivae normal  Ears: canals clear bilaterally, TM normal   Nose: external nose normal   Oropharynx: clear   Resp: Clear to auscultation  CVS: Regular rate and rhythm  Abdomen: soft, NT, ND  Ext: no edema, NT of lower extremities  : erythematous rash in between thighs and scrotal area  Neuro: gait normal     Assessment & Plan  Medicare annual wellness visit, subsequent         Mixed hyperlipidemia    Orders:    Lipid Panel; Future    CBC; Future    Bipolar affective  disorder, currently depressed, moderate (Multi)  Under control        Paroxysmal atrial fibrillation (Multi)  Continue follow up with cardiology       Centrilobular emphysema (Multi)  Stable, continue current inhalers       Tinea cruris    Orders:    nystatin (Mycostatin) 100,000 unit/gram powder; Apply 1 Application topically 2 times a day.    fluconazole (Diflucan) 100 mg tablet; Take 1 tablet (100 mg) by mouth 1 (one) time per week. Do not take simvastatin on the days taking the diflucan    Chronic pain of both shoulders    Orders:    Referral to Orthopaedic Surgery; Future    XR shoulder 2+ views bilateral; Future    ketorolac (Toradol) injection 30 mg    BMI 27.0-27.9,adult

## 2025-02-12 ENCOUNTER — HOSPITAL ENCOUNTER (OUTPATIENT)
Dept: RADIOLOGY | Facility: HOSPITAL | Age: 70
Discharge: HOME | End: 2025-02-12
Payer: MEDICARE

## 2025-02-12 DIAGNOSIS — I10 ESSENTIAL HYPERTENSION: ICD-10-CM

## 2025-02-12 DIAGNOSIS — R21 RASH AND OTHER NONSPECIFIC SKIN ERUPTION: ICD-10-CM

## 2025-02-12 DIAGNOSIS — J44.9 CHRONIC OBSTRUCTIVE PULMONARY DISEASE, UNSPECIFIED COPD TYPE (MULTI): ICD-10-CM

## 2025-02-12 DIAGNOSIS — E78.5 HYPERLIPIDEMIA, UNSPECIFIED: ICD-10-CM

## 2025-02-12 DIAGNOSIS — M25.511 CHRONIC PAIN OF BOTH SHOULDERS: ICD-10-CM

## 2025-02-12 DIAGNOSIS — B35.6 TINEA CRURIS: ICD-10-CM

## 2025-02-12 DIAGNOSIS — G89.29 CHRONIC PAIN OF BOTH SHOULDERS: ICD-10-CM

## 2025-02-12 DIAGNOSIS — M25.512 CHRONIC PAIN OF BOTH SHOULDERS: ICD-10-CM

## 2025-02-12 DIAGNOSIS — F41.8 OTHER SPECIFIED ANXIETY DISORDERS: ICD-10-CM

## 2025-02-12 PROCEDURE — 73030 X-RAY EXAM OF SHOULDER: CPT | Mod: 50

## 2025-02-13 RX ORDER — LOSARTAN POTASSIUM 25 MG/1
25 TABLET ORAL DAILY
Qty: 90 TABLET | Refills: 1 | Status: SHIPPED | OUTPATIENT
Start: 2025-02-13

## 2025-02-13 RX ORDER — FLUCONAZOLE 100 MG/1
100 TABLET ORAL WEEKLY
Qty: 4 TABLET | Refills: 0 | OUTPATIENT
Start: 2025-02-13

## 2025-02-13 RX ORDER — KETOCONAZOLE 20 MG/G
CREAM TOPICAL DAILY
Qty: 30 G | Refills: 0 | Status: SHIPPED | OUTPATIENT
Start: 2025-02-13

## 2025-02-13 RX ORDER — ALBUTEROL SULFATE 90 UG/1
1 INHALANT RESPIRATORY (INHALATION) EVERY 6 HOURS PRN
Qty: 8 G | Refills: 5 | Status: SHIPPED | OUTPATIENT
Start: 2025-02-13 | End: 2026-02-13

## 2025-02-13 RX ORDER — CLOTRIMAZOLE AND BETAMETHASONE DIPROPIONATE 10; .64 MG/G; MG/G
CREAM TOPICAL
Qty: 45 G | Refills: 1 | OUTPATIENT
Start: 2025-02-13

## 2025-02-13 RX ORDER — NYSTATIN TOPICAL POWDER 100000 U/G
POWDER TOPICAL 2 TIMES DAILY
Qty: 60 G | Refills: 0 | OUTPATIENT
Start: 2025-02-13

## 2025-02-13 RX ORDER — SIMVASTATIN 20 MG/1
TABLET, FILM COATED ORAL
Qty: 90 TABLET | Refills: 3 | Status: SHIPPED | OUTPATIENT
Start: 2025-02-13

## 2025-02-13 RX ORDER — QUETIAPINE FUMARATE 25 MG/1
TABLET, FILM COATED ORAL
Qty: 180 TABLET | Refills: 3 | Status: SHIPPED | OUTPATIENT
Start: 2025-02-13

## 2025-02-20 ENCOUNTER — OFFICE VISIT (OUTPATIENT)
Dept: PRIMARY CARE | Facility: CLINIC | Age: 70
End: 2025-02-20
Payer: MEDICARE

## 2025-02-20 VITALS
HEIGHT: 72 IN | BODY MASS INDEX: 27.39 KG/M2 | WEIGHT: 202.2 LBS | OXYGEN SATURATION: 98 % | DIASTOLIC BLOOD PRESSURE: 68 MMHG | HEART RATE: 78 BPM | TEMPERATURE: 97.8 F | SYSTOLIC BLOOD PRESSURE: 137 MMHG

## 2025-02-20 DIAGNOSIS — J44.1 COPD EXACERBATION (MULTI): Primary | ICD-10-CM

## 2025-02-20 DIAGNOSIS — M25.512 CHRONIC PAIN OF BOTH SHOULDERS: ICD-10-CM

## 2025-02-20 DIAGNOSIS — M25.511 CHRONIC PAIN OF BOTH SHOULDERS: ICD-10-CM

## 2025-02-20 DIAGNOSIS — G89.29 CHRONIC PAIN OF BOTH SHOULDERS: ICD-10-CM

## 2025-02-20 DIAGNOSIS — E53.8 VITAMIN B12 DEFICIENCY: ICD-10-CM

## 2025-02-20 DIAGNOSIS — M54.12 CERVICAL RADICULOPATHY: ICD-10-CM

## 2025-02-20 PROCEDURE — 94640 AIRWAY INHALATION TREATMENT: CPT | Performed by: FAMILY MEDICINE

## 2025-02-20 PROCEDURE — 3075F SYST BP GE 130 - 139MM HG: CPT | Performed by: FAMILY MEDICINE

## 2025-02-20 PROCEDURE — 3078F DIAST BP <80 MM HG: CPT | Performed by: FAMILY MEDICINE

## 2025-02-20 PROCEDURE — 99214 OFFICE O/P EST MOD 30 MIN: CPT | Performed by: FAMILY MEDICINE

## 2025-02-20 PROCEDURE — 1159F MED LIST DOCD IN RCRD: CPT | Performed by: FAMILY MEDICINE

## 2025-02-20 PROCEDURE — 3008F BODY MASS INDEX DOCD: CPT | Performed by: FAMILY MEDICINE

## 2025-02-20 RX ORDER — PREGABALIN 200 MG/1
200 CAPSULE ORAL 2 TIMES DAILY
Qty: 60 CAPSULE | Refills: 3 | Status: SHIPPED | OUTPATIENT
Start: 2025-02-20

## 2025-02-20 RX ORDER — IPRATROPIUM BROMIDE AND ALBUTEROL SULFATE 2.5; .5 MG/3ML; MG/3ML
3 SOLUTION RESPIRATORY (INHALATION) ONCE
Status: COMPLETED | OUTPATIENT
Start: 2025-02-20 | End: 2025-02-20

## 2025-02-20 RX ORDER — ALBUTEROL SULFATE 0.83 MG/ML
2.5 SOLUTION RESPIRATORY (INHALATION) EVERY 6 HOURS PRN
Qty: 75 ML | Refills: 1 | Status: SHIPPED | OUTPATIENT
Start: 2025-02-20

## 2025-02-20 RX ORDER — FLUTICASONE FUROATE, UMECLIDINIUM BROMIDE AND VILANTEROL TRIFENATATE 200; 62.5; 25 UG/1; UG/1; UG/1
1 POWDER RESPIRATORY (INHALATION) DAILY
Qty: 60 EACH | Refills: 1 | Status: SHIPPED | OUTPATIENT
Start: 2025-02-20

## 2025-02-20 RX ORDER — MELOXICAM 15 MG/1
15 TABLET ORAL DAILY PRN
Qty: 30 TABLET | Refills: 11 | Status: SHIPPED | OUTPATIENT
Start: 2025-02-20 | End: 2026-02-20

## 2025-02-20 RX ORDER — DOXYCYCLINE 100 MG/1
100 CAPSULE ORAL 2 TIMES DAILY
Qty: 20 CAPSULE | Refills: 0 | Status: SHIPPED | OUTPATIENT
Start: 2025-02-20 | End: 2025-03-02

## 2025-02-20 RX ORDER — PREDNISONE 20 MG/1
TABLET ORAL
Qty: 18 TABLET | Refills: 0 | Status: SHIPPED | OUTPATIENT
Start: 2025-02-20

## 2025-02-20 RX ORDER — CYANOCOBALAMIN 1000 UG/ML
1000 INJECTION, SOLUTION INTRAMUSCULAR; SUBCUTANEOUS
Qty: 1 ML | Refills: 11 | Status: SHIPPED | OUTPATIENT
Start: 2025-02-20

## 2025-02-20 RX ORDER — FLUTICASONE FUROATE, UMECLIDINIUM BROMIDE AND VILANTEROL TRIFENATATE 200; 62.5; 25 UG/1; UG/1; UG/1
1 POWDER RESPIRATORY (INHALATION) DAILY
Qty: 60 EACH | Refills: 11 | OUTPATIENT
Start: 2025-02-20

## 2025-02-20 RX ADMIN — IPRATROPIUM BROMIDE AND ALBUTEROL SULFATE 3 ML: 2.5; .5 SOLUTION RESPIRATORY (INHALATION) at 15:44

## 2025-02-20 ASSESSMENT — PATIENT HEALTH QUESTIONNAIRE - PHQ9
2. FEELING DOWN, DEPRESSED OR HOPELESS: SEVERAL DAYS
SUM OF ALL RESPONSES TO PHQ9 QUESTIONS 1 AND 2: 2
10. IF YOU CHECKED OFF ANY PROBLEMS, HOW DIFFICULT HAVE THESE PROBLEMS MADE IT FOR YOU TO DO YOUR WORK, TAKE CARE OF THINGS AT HOME, OR GET ALONG WITH OTHER PEOPLE: NOT DIFFICULT AT ALL
1. LITTLE INTEREST OR PLEASURE IN DOING THINGS: SEVERAL DAYS

## 2025-02-20 NOTE — PROGRESS NOTES
Subjective   Patient ID: uJanjo Alexis is a 69 y.o. male who presents for Cough (Productive cough x 4 days.  Chest pressure, hurts to breathe, sore throat. No f/v/n/d.  )    HPI  Here for urgent visit  Has been having productive cough, wheezing, sore throat  x 3-4 days. Has chest tightness. Been using his albuterol more often.   Been out of his trelegy, needs refill.   Is following with pulmonology  Also needs refills of his lyrica, seroquel.   Would like something to use for his shoulder pain which he has had for years, but has been worsening. Has appt with orthopedics. Xray showed arthritis in both shoulders    Review of Systems  General: no fever  Eyes: no blurry vision  ENT: no sore throat, no ear pain  Resp: see HPI  Cardio: no chest pain, no palpitations  Abd: no nausea/vomiting  : no dysuria, no increased urinary frequency      /68   Pulse 78   Temp 36.6 °C (97.8 °F)   Ht 1.829 m (6')   Wt 91.7 kg (202 lb 3.2 oz)   SpO2 98%   BMI 27.42 kg/m²       Objective   Physical Exam  Gen: NAD, alert  Head: normocephalic/atraumatic  Eyes: conjunctivae normal  Ears: canals clear bilaterally, TM normal   Nose: external nose normal   Oropharynx: clear   Resp: diffuse wheezing  CVS: Regular rate and rhythm  Abdomen: soft, NT, ND  Ext: no edema, NT of lower extremities  Neuro: gait normal     Personally reviewed the following imaging    Xray shoulder - Degenerative changes of both shoulders as described above.   No sign of acute fracture or dislocation.    Osteopenia.    Assessment/Plan   Problem List Items Addressed This Visit       Cervical radiculopathy    Relevant Medications    pregabalin (Lyrica) 200 mg capsule    Vitamin B12 deficiency    Relevant Medications    cyanocobalamin (Vitamin B-12) 1,000 mcg/mL injection     Other Visit Diagnoses       COPD exacerbation (Multi)    -  Primary    Relevant Medications    fluticasone-umeclidin-vilanter (Trelegy Ellipta) 200-62.5-25 mcg blister with device     albuterol 2.5 mg /3 mL (0.083 %) nebulizer solution    ipratropium-albuteroL (Duo-Neb) 0.5-2.5 mg/3 mL nebulizer solution 3 mL (Completed)    doxycycline (Monodox) 100 mg capsule    predniSONE (Deltasone) 20 mg tablet    Chronic pain of both shoulders        Relevant Medications    meloxicam (Mobic) 15 mg tablet

## 2025-02-21 LAB
CHOLEST SERPL-MCNC: 119 MG/DL
CHOLEST/HDLC SERPL: 3.7 (CALC)
ERYTHROCYTE [DISTWIDTH] IN BLOOD BY AUTOMATED COUNT: 14.6 % (ref 11–15)
HCT VFR BLD AUTO: 45.1 % (ref 38.5–50)
HDLC SERPL-MCNC: 32 MG/DL
HGB BLD-MCNC: 14.2 G/DL (ref 13.2–17.1)
LDLC SERPL CALC-MCNC: 70 MG/DL (CALC)
MCH RBC QN AUTO: 27.2 PG (ref 27–33)
MCHC RBC AUTO-ENTMCNC: 31.5 G/DL (ref 32–36)
MCV RBC AUTO: 86.4 FL (ref 80–100)
NONHDLC SERPL-MCNC: 87 MG/DL (CALC)
PLATELET # BLD AUTO: 364 THOUSAND/UL (ref 140–400)
PMV BLD REES-ECKER: 11.1 FL (ref 7.5–12.5)
RBC # BLD AUTO: 5.22 MILLION/UL (ref 4.2–5.8)
TRIGL SERPL-MCNC: 87 MG/DL
WBC # BLD AUTO: 12.4 THOUSAND/UL (ref 3.8–10.8)

## 2025-03-03 ENCOUNTER — APPOINTMENT (OUTPATIENT)
Dept: ORTHOPEDIC SURGERY | Facility: CLINIC | Age: 70
End: 2025-03-03
Payer: MEDICARE

## 2025-03-03 DIAGNOSIS — G89.29 CHRONIC PAIN OF BOTH SHOULDERS: Primary | ICD-10-CM

## 2025-03-03 DIAGNOSIS — M75.52 BILATERAL SHOULDER BURSITIS: ICD-10-CM

## 2025-03-03 DIAGNOSIS — M25.512 CHRONIC PAIN OF BOTH SHOULDERS: Primary | ICD-10-CM

## 2025-03-03 DIAGNOSIS — M25.811 IMPINGEMENT OF BOTH SHOULDERS: ICD-10-CM

## 2025-03-03 DIAGNOSIS — M75.51 BILATERAL SHOULDER BURSITIS: ICD-10-CM

## 2025-03-03 DIAGNOSIS — M25.511 CHRONIC PAIN OF BOTH SHOULDERS: Primary | ICD-10-CM

## 2025-03-03 DIAGNOSIS — M25.812 IMPINGEMENT OF BOTH SHOULDERS: ICD-10-CM

## 2025-03-03 PROCEDURE — 1160F RVW MEDS BY RX/DR IN RCRD: CPT | Performed by: ORTHOPAEDIC SURGERY

## 2025-03-03 PROCEDURE — 1125F AMNT PAIN NOTED PAIN PRSNT: CPT | Performed by: ORTHOPAEDIC SURGERY

## 2025-03-03 PROCEDURE — 99213 OFFICE O/P EST LOW 20 MIN: CPT | Performed by: ORTHOPAEDIC SURGERY

## 2025-03-03 PROCEDURE — 1159F MED LIST DOCD IN RCRD: CPT | Performed by: ORTHOPAEDIC SURGERY

## 2025-03-03 PROCEDURE — 20611 DRAIN/INJ JOINT/BURSA W/US: CPT | Performed by: ORTHOPAEDIC SURGERY

## 2025-03-03 RX ADMIN — TRIAMCINOLONE ACETONIDE 40 MG: 40 INJECTION, SUSPENSION INTRA-ARTICULAR; INTRAMUSCULAR at 15:31

## 2025-03-03 RX ADMIN — LIDOCAINE HYDROCHLORIDE 3 ML: 10 INJECTION, SOLUTION INFILTRATION; PERINEURAL at 15:31

## 2025-03-03 ASSESSMENT — ENCOUNTER SYMPTOMS
FATIGUE: 0
FEVER: 0
ARTHRALGIAS: 1
WHEEZING: 0
BRUISES/BLEEDS EASILY: 0
NUMBNESS: 0
SHORTNESS OF BREATH: 0
CHILLS: 0

## 2025-03-03 ASSESSMENT — PATIENT HEALTH QUESTIONNAIRE - PHQ9
1. LITTLE INTEREST OR PLEASURE IN DOING THINGS: NOT AT ALL
SUM OF ALL RESPONSES TO PHQ9 QUESTIONS 1 AND 2: 2
2. FEELING DOWN, DEPRESSED OR HOPELESS: MORE THAN HALF THE DAYS
10. IF YOU CHECKED OFF ANY PROBLEMS, HOW DIFFICULT HAVE THESE PROBLEMS MADE IT FOR YOU TO DO YOUR WORK, TAKE CARE OF THINGS AT HOME, OR GET ALONG WITH OTHER PEOPLE: SOMEWHAT DIFFICULT

## 2025-03-03 ASSESSMENT — PAIN SCALES - GENERAL: PAINLEVEL_OUTOF10: 10-WORST PAIN EVER

## 2025-03-03 NOTE — PROGRESS NOTES
Reason for Appointment  No chief complaint on file.    History of Present Illness  New patient is a 69 y.o. male here today for evaluation of bilateral shoulder pain. X-rays taken of the bilateral shoulders on 2/12/25 were reviewed and showed moderate ac joint arthritis, minimal joint arthritis. Today he states the right shoulder is worse that radiates into his back. He has pain when laying on his side. He has scapular pain. No numbness or tingling. His symptoms started a year ago. No injury or fall. Ac separation on the right back in the 80's. He is retired, he worked as a . He reports popping of the biceps. Past medical history allergies medications social and family history all reviewed.       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    Neuralgia and neuritis, unspecified     Neuritis    Nontoxic single thyroid nodule     Solitary thyroid nodule    Other specified anxiety disorders     Depression with anxiety    Personal history of benign carcinoid tumor 12/16/2015    History of benign carcinoid tumor       Past Surgical History:   Procedure Laterality Date    COLONOSCOPY  05/20/2013    Complete Colonoscopy    OTHER SURGICAL HISTORY  03/18/2013    Excision Of Burn Tissue Shoulder    OTHER SURGICAL HISTORY  03/04/2021    Pancreatic surgery    SHOULDER SURGERY  04/01/2013    Shoulder Surgery    SMALL INTESTINE SURGERY  06/30/2017    Small Bowel Resection       Medication Documentation Review Audit       Reviewed by Cristian Howard MA (Medical Assistant) on 03/03/25 at 1511      Medication Order Taking? Sig Documenting Provider Last Dose  "Status   albuterol 2.5 mg /3 mL (0.083 %) nebulizer solution [id number]  Take 3 mL (2.5 mg) by nebulization every 6 hours if needed for wheezing. Lupe Woody MD  Active   albuterol 90 mcg/actuation inhaler [id number]  INHALE 1 PUFF EVERY 6 HOURS IF NEEDED FOR WHEEZING OR SHORTNESS OF BREATH. Lupe Woody MD  Active   amLODIPine (Norvasc) 10 mg tablet [id number] No TAKE 1 TABLET BY MOUTH EVERY DAY Lupe Woody MD Taking Active   BD Integra Syringe 3 mL 25 gauge x 5/8\" syringe [id number] No INJECT VITAMIN B12 ONCE MONTHLY Lupe Woody MD Taking Active   cyanocobalamin (Vitamin B-12) 1,000 mcg/mL injection [id number]  Inject 1 mL (1,000 mcg) into the muscle every 30 (thirty) days. Lupe Woody MD  Active   cyclobenzaprine (Flexeril) 10 mg tablet [id number] No Take 1 tablet (10 mg) by mouth as needed at bedtime (for back pain).   Patient not taking: Reported on 2025    Lupe Woody MD Taking Active   doxycycline (Monodox) 100 mg capsule [id number]  Take 1 capsule (100 mg) by mouth 2 times a day for 10 days. Take with at least 8 ounces (large glass) of water, do not lie down for 30 minutes after Lupe Woody MD   25 7412   DULoxetine (Cymbalta) 30 mg DR capsule [id number] No TAKE 1 CAPSULE BY MOUTH ONCE DAILY, TAKE WITH THE 60MG TO EQUAL 90MG Lupe Woody MD Taking Active   DULoxetine (Cymbalta) 60 mg DR capsule [id number]  TAKE 1 CAPSULE DAILY ALONG WITH THE 30MG, TO EQUAL 90MG Lupe Woody MD  Active   fluticasone-umeclidin-vilanter (Trelegy Ellipta) 200-62.5-25 mcg blister with device [id number]  Inhale 1 puff once daily. Lupe Woody MD  Active   ketoconazole (NIZOral) 2 % cream [id number]  APPLY TOPICALLY ONCE DAILY. AS NEEDED FOR RASH Lupe Woody MD  Active   lidocaine (Lidoderm) 5 % patch [id number]  Place 1 patch over 12 hours on the skin once daily. Remove & " discard patch within 12 hours or as directed by MD.   Patient not taking: Reported on 2/20/2025    Josh Burciaga MD  Active   losartan (Cozaar) 25 mg tablet [id number]  TAKE 1 TABLET BY MOUTH EVERY DAY Lpue Woody MD  Active   meloxicam (Mobic) 15 mg tablet [id number]  Take 1 tablet (15 mg) by mouth once daily as needed for moderate pain (4 - 6). Take with food Lupe Woody MD  Active   metoprolol tartrate (Lopressor) 50 mg tablet [id number]  TAKE 1 TABLET BY MOUTH TWICE A DAY Paige Bean PA-C  Active   nystatin (Mycostatin) 100,000 unit/gram powder [id number]  Apply 1 Application topically 2 times a day. Lupe Woody MD  Active   predniSONE (Deltasone) 20 mg tablet [id number]  Take 2 tablets for 5 days, then take 1 tablet for 5 days, then take 1/2 tablet for 5 days Lupe Woody MD  Active   pregabalin (Lyrica) 200 mg capsule [id number]  Take 1 capsule (200 mg) by mouth 2 times a day. Lupe Woody MD  Active   QUEtiapine (SEROquel) 25 mg tablet [id number]  TAKE 1 TABLET BY MOUTH TWICE A DAY Lupe Woody MD  Active   simvastatin (Zocor) 20 mg tablet [id number]  TAKE 1 TABLET BY MOUTH EVERY DAY IN THE EVENING Lupe Woody MD  Active                    Allergies   Allergen Reactions    Amoxicillin-Pot Clavulanate Unknown     GI upset    Azithromycin GI Upset    Codeine Unknown    Hydrochlorothiazide Unknown    Ondansetron Hcl Unknown       Review of Systems   Constitutional:  Negative for chills, fatigue and fever.   Respiratory:  Negative for shortness of breath and wheezing.    Cardiovascular:  Negative for chest pain and leg swelling.   Musculoskeletal:  Positive for arthralgias.   Allergic/Immunologic: Negative for immunocompromised state.   Neurological:  Negative for numbness.   Hematological:  Does not bruise/bleed easily.       Exam   Pt is alert awake, orientated to person place and time. No acute distress. Mood is good.  Good pulses and good sensation. Negative Varela's sign. No auditory wheezes. No JVD.  No hyperreflexia. No skin changes. Good capillary refill. Pericapsular tenderness right greater than left. Stiffness in the neck. Overall cuff strength maintained Good deltoid function. Mildly positive impingement sign.   Assessment   Encounter Diagnosis   Name Primary?    Chronic pain of both shoulders    Bilateral shoulder impingement bursitis    Plan   We will go ahead with a right subacromial injection today that will be diagnostic and therapeutic. He can follow up in 6 weeks for reevaluation. Depending on how much relief he gets at his next appointment we will possibly discuss spinal intervention for a pinched nerve in the neck. We will do one cortisone injection into the right subacromial space in hopes to calm their symptoms nicely. Pt understands the small risk of infection and warning signs including flare reaction.       Patient ID: Juanjo Alexis is a 69 y.o. male.    L Inj/Asp: R subacromial bursa on 3/3/2025 3:31 PM  Indications: pain  Details: 22 G needle, ultrasound-guided  Medications: 40 mg triamcinolone acetonide 40 mg/mL; 3 mL lidocaine 10 mg/mL (1 %)  Outcome: tolerated well, no immediate complications    After discussing the risks and benefits of the procedure with proceeded with an injection.  Using ultrasound guidance we identified the acromion, humeral head and the subacromial bursa, images obtained and saved. We then sterilely injected the right subacrominal space with a mixture of 40 mg of Kenalog and 1 cc of 1 % lidocaine. Pt tolerated the procedure well without any adverse reactions.    Procedure, treatment alternatives, risks and benefits explained, specific risks discussed. Consent was given by the patient. Immediately prior to procedure a time out was called to verify the correct patient, procedure, equipment, support staff and site/side marked as required. Patient was prepped and draped in the usual  sterile fashion.           I, Belle Clay, attest that this documentation has been prepared under the direction and in the presence of Nate Graves MD.   By signing below, I, Nate Graves MD, personally performed the services described in this documentation. All medical record entries made by the scribe were at my direction and in my presence. I have reviewed the chart and agree that the record reflects my personal performance and is accurate and complete.

## 2025-03-04 RX ORDER — LIDOCAINE HYDROCHLORIDE 10 MG/ML
3 INJECTION, SOLUTION INFILTRATION; PERINEURAL
Status: COMPLETED | OUTPATIENT
Start: 2025-03-03 | End: 2025-03-03

## 2025-03-04 RX ORDER — TRIAMCINOLONE ACETONIDE 40 MG/ML
40 INJECTION, SUSPENSION INTRA-ARTICULAR; INTRAMUSCULAR
Status: COMPLETED | OUTPATIENT
Start: 2025-03-03 | End: 2025-03-03

## 2025-03-18 ENCOUNTER — APPOINTMENT (OUTPATIENT)
Dept: DERMATOLOGY | Facility: CLINIC | Age: 70
End: 2025-03-18
Payer: MEDICARE

## 2025-03-18 DIAGNOSIS — L90.5 SCAR CONDITIONS AND FIBROSIS OF SKIN: ICD-10-CM

## 2025-03-18 DIAGNOSIS — L57.9 SKIN CHANGES DUE TO CHRONIC EXPOSURE TO NONIONIZING RADIATION: ICD-10-CM

## 2025-03-18 DIAGNOSIS — Z85.828 HISTORY OF NONMELANOMA SKIN CANCER: ICD-10-CM

## 2025-03-18 DIAGNOSIS — L81.4 LENTIGO: ICD-10-CM

## 2025-03-18 DIAGNOSIS — L82.0 INFLAMED SEBORRHEIC KERATOSIS: ICD-10-CM

## 2025-03-18 DIAGNOSIS — R20.2 NOTALGIA PARESTHETICA: ICD-10-CM

## 2025-03-18 DIAGNOSIS — L82.1 SEBORRHEIC KERATOSIS: ICD-10-CM

## 2025-03-18 DIAGNOSIS — D18.01 ANGIOMA OF SKIN: ICD-10-CM

## 2025-03-18 DIAGNOSIS — L30.4 INTERTRIGO: Primary | ICD-10-CM

## 2025-03-18 DIAGNOSIS — D22.9 BENIGN NEVUS: ICD-10-CM

## 2025-03-18 PROCEDURE — 1160F RVW MEDS BY RX/DR IN RCRD: CPT | Performed by: NURSE PRACTITIONER

## 2025-03-18 PROCEDURE — 1159F MED LIST DOCD IN RCRD: CPT | Performed by: NURSE PRACTITIONER

## 2025-03-18 PROCEDURE — 17110 DESTRUCTION B9 LES UP TO 14: CPT | Performed by: NURSE PRACTITIONER

## 2025-03-18 PROCEDURE — 99214 OFFICE O/P EST MOD 30 MIN: CPT | Performed by: NURSE PRACTITIONER

## 2025-03-18 RX ORDER — KETOCONAZOLE 20 MG/G
CREAM TOPICAL
Qty: 60 G | Refills: 11 | Status: SHIPPED | OUTPATIENT
Start: 2025-03-18

## 2025-03-18 NOTE — PROGRESS NOTES
Subjective     Juanjo Alexis is a 69 y.o. male who presents for the following: Suspicious Skin Lesion (back) and Rash (Groin.).     Rash treated with diflucan and nystatin powder only. Has not tried ketoconazole cream. Does admit to being sweaty in the area. No recent boils reported.     Mid upper back lesion is very irritated.     Review of Systems:  No other skin or systemic complaints other than what is documented elsewhere in the note.    The following portions of the chart were reviewed this encounter and updated as appropriate:   Tobacco  Allergies  Meds  Problems  Med Hx  Surg Hx         Skin Cancer History  No skin cancer on file.      Specialty Problems          Dermatology Problems    Squamous cell carcinoma of skin    Groin rash    Skin lesion of face    Infected sebaceous cyst of skin        Objective   Well appearing patient in no apparent distress; mood and affect are within normal limits.    A focused skin examination was performed waist up. All findings within normal limits unless otherwise noted below.    Assessment/Plan   1. Intertrigo  Left Inguinal Area, Right Inguinal Area  Linear erythematous to pink patches with in skin folds with some maceration noted and minimal fissures    PLAN    Ketoconazole cream twice daily to affected areas following cleaning and thorough drying of the skin.   Gently cleanse the affected areas daily with mild soap (eg, Nature by Canus, Vanicream bar soap) or soap substitutes (eg, Cetaphil).  Dry the areas well. Additionally, wearing absorbent cotton or fabric may help (as long as fabrics are changed if they become damp). Mild antiperspirants and non-talc drying powders may help, but these can cause further irritation in some individuals.  Consider the use of InterDry fabric to absorb moisture where areas of skin contact skin.  Barrier creams, such as zinc oxide paste (eg, Desitin, Triple Paste, Vusion), may be helpful for individuals who wear diapers or have  problems with incontinence.    Related Procedures  Follow Up In Dermatology - Established Patient    Related Medications  ketoconazole (NIZOral) 2 % cream  To affected areas twice daily as needed    2. Angioma of skin  Scattered cherry-red papule(s).    A cherry hemangioma is a small macule (small, flat, smooth area) or papule (small, solid bump) formed from an overgrowth of tiny blood vessels in the skin. Cherry hemangiomas are characteristically red or purplish in color. They often first appear in middle adulthood and usually increase in number with age. Cherry hemangiomas are noncancerous (benign) and are common in adults.    The present appearance of the lesion is not worrisome but it should continue to be observed and testing/treatment may be warranted if change occurs.    Related Procedures  Follow Up In Dermatology - Established Patient    3. Benign nevus  Scattered, uniform and benign-appearing, regular brown melanocytic papules and macules.    The present appearance of the lesion is not worrisome but it should continue to be observed and testing/treatment may be warranted if change occurs.    Related Procedures  Follow Up In Dermatology - Established Patient    4. Seborrheic keratosis  Stuck on verrucous, tan-brown papules and plaques.      Seborrheic keratoses are common noncancerous (benign) growths of unknown cause seen in adults due to a thickening of an area of the top skin layer. Seborrheic keratoses may appear as if they are stuck on to the skin. They have distinct borders, and they may appear as papules (small, solid bumps) or plaques (solid, raised patches that are bigger than a thumbnail). They may be the same color as your skin, or they may be pink, light brown, darker brown, or very dark brown, or sometimes may appear black.    There is no way to prevent new seborrheic keratoses from forming. Seborrheic keratoses can be removed, but removal is considered a cosmetic issue and is usually not  covered by insurance.    PLAN  No treatment is needed unless there is irritation from clothing, such as itching or bleeding.  2.   Some lotions containing alpha hydroxy acids, salicylic acid, or urea may make the areas feel smoother with regular use but will not eliminate them.    Related Procedures  Follow Up In Dermatology - Established Patient    5. Lentigo  Scattered tan macules in sun-exposed areas.    A solar lentigo (plural, solar lentigines), also known as a sun-induced freckle or senile lentigo, is a dark (hyperpigmented) lesion caused by natural or artificial ultraviolet (UV) light. Solar lentigines may be single or multiple. This type of lentigo is different from a simple lentigo (lentigo simplex) because it is caused by exposure to UV light. Solar lentigines are benign, but they do indicate excessive sun exposure, a risk factor for the development of skin cancer.    To prevent solar lentigines, avoid exposure to sunlight in midday (10 AM to 3 PM), wear sun-protective clothing (tightly woven clothes and hats), and apply sunscreen (SPF 30 UVA and UVB block).    The present appearance of the lesion is not worrisome but it should continue to be observed and testing/treatment may be warranted if change occurs.    Related Procedures  Follow Up In Dermatology - Established Patient    6. Scar conditions and fibrosis of skin  Left Upper Arm - Posterior  Well healed scar at the site(s) of prior treatment with no evidence of recurrence.          The scar is clear, there is no evidence of recurrence.  The present appearance of the scar is not worrisome but it should continue to be observed and testing/treatment may be warranted if change occurs.      Related Procedures  Follow Up In Dermatology - Established Patient    7. History of nonmelanoma skin cancer    ABCDEs of melanoma and atypical moles were discussed with the patient.    Patient was instructed to perform monthly self skin examination.  We recommended that  the patient have regular full skin exams given an increased risk of subsequent skin cancers.    The patient was instructed to use sun protective behaviors including use of broad spectrum sunscreens and sun protective clothing to reduce risk of skin cancers.    Warning signs of non-melanoma skin cancer discussed.    Related Procedures  Follow Up In Dermatology - Established Patient    8. Skin changes due to chronic exposure to nonionizing radiation  Actinic changes in the form of freckles, lentigines and hyper/hypopigmentation     ABCDEs of melanoma and atypical moles were discussed with the patient.    Patient was instructed to perform monthly self skin examination.  We recommended that the patient have regular full skin exams given an increased risk of subsequent skin cancers.    The patient was instructed to use sun protective behaviors including use of broad spectrum sunscreens and sun protective clothing to reduce risk of skin cancers.    Warning signs of non-melanoma skin cancer discussed.    Related Procedures  Follow Up In Dermatology - Established Patient    9. Inflamed seborrheic keratosis  Mid Back  Erythematous excoriated crusty verrucal papule(s) and/or plaque(s)    Inflamed Seborrheic Keratosis  - Benign nature of lesion(s) discussed with patient, and reassurance provided.  - Given the lesion (s) are very itchy or irritated, the patient is seeking removal and treatment with liquid nitrogen cryotherapy in clinic today was recommended.  - The patient expressed understanding, is in agreement with this plan, and wishes to proceed with liquid nitrogen cryotherapy as documented above.    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 Notalgia paresthetica may be caused by a problem with the nerve cells that provide feeling to the skin of the upper back (sensory neuropathy). There is often a darker patch (a flat, smooth area of skin larger than a thumbnail) where the skin is itchy. This is due to chronic rubbing and scratching of the affected area.    PLAN    Over the counter Sarna anti-itch lotion as needed  Over the counter Capsaicin cream as needed  Over the counter Eucerin skin calming lotion    Related Procedures  Follow Up In Dermatology - Established Patient        Return in 6 months for routine skin check or return to clinic sooner if needed

## 2025-03-18 NOTE — PATIENT INSTRUCTIONS

## 2025-03-21 DIAGNOSIS — I10 ESSENTIAL (PRIMARY) HYPERTENSION: ICD-10-CM

## 2025-03-22 RX ORDER — AMLODIPINE BESYLATE 10 MG/1
TABLET ORAL
Qty: 90 TABLET | Refills: 3 | Status: SHIPPED | OUTPATIENT
Start: 2025-03-22

## 2025-03-31 ENCOUNTER — TELEPHONE (OUTPATIENT)
Dept: PRIMARY CARE | Facility: CLINIC | Age: 70
End: 2025-03-31
Payer: MEDICARE

## 2025-04-14 ENCOUNTER — APPOINTMENT (OUTPATIENT)
Dept: ORTHOPEDIC SURGERY | Facility: CLINIC | Age: 70
End: 2025-04-14
Payer: MEDICARE

## 2025-04-28 ENCOUNTER — APPOINTMENT (OUTPATIENT)
Dept: DERMATOLOGY | Facility: CLINIC | Age: 70
End: 2025-04-28
Payer: MEDICARE

## 2025-04-30 ENCOUNTER — APPOINTMENT (OUTPATIENT)
Dept: PRIMARY CARE | Facility: CLINIC | Age: 70
End: 2025-04-30
Payer: MEDICARE

## 2025-04-30 VITALS
OXYGEN SATURATION: 95 % | TEMPERATURE: 98.1 F | HEIGHT: 72 IN | HEART RATE: 83 BPM | WEIGHT: 200 LBS | SYSTOLIC BLOOD PRESSURE: 136 MMHG | BODY MASS INDEX: 27.09 KG/M2 | DIASTOLIC BLOOD PRESSURE: 72 MMHG

## 2025-04-30 DIAGNOSIS — J44.1 COPD EXACERBATION (MULTI): ICD-10-CM

## 2025-04-30 DIAGNOSIS — M25.511 ACUTE PAIN OF RIGHT SHOULDER: ICD-10-CM

## 2025-04-30 DIAGNOSIS — R73.9 BORDERLINE HYPERGLYCEMIA: Primary | ICD-10-CM

## 2025-04-30 LAB — POC HEMOGLOBIN A1C: 5.5 % (ref 4.2–6.5)

## 2025-04-30 RX ORDER — KETOROLAC TROMETHAMINE 30 MG/ML
30 INJECTION, SOLUTION INTRAMUSCULAR; INTRAVENOUS ONCE
Status: COMPLETED | OUTPATIENT
Start: 2025-04-30 | End: 2025-04-30

## 2025-04-30 RX ORDER — DOXYCYCLINE 100 MG/1
100 CAPSULE ORAL 2 TIMES DAILY
Qty: 20 CAPSULE | Refills: 0 | Status: SHIPPED | OUTPATIENT
Start: 2025-04-30 | End: 2025-05-10

## 2025-04-30 RX ORDER — PREDNISONE 20 MG/1
40 TABLET ORAL DAILY
Qty: 10 TABLET | Refills: 0 | Status: SHIPPED | OUTPATIENT
Start: 2025-04-30 | End: 2025-05-05

## 2025-04-30 RX ADMIN — KETOROLAC TROMETHAMINE 30 MG: 30 INJECTION, SOLUTION INTRAMUSCULAR; INTRAVENOUS at 10:44

## 2025-04-30 NOTE — PROGRESS NOTES
Subjective   Patient ID: Juanjo Alexis is a 70 y.o. male who presents for Follow-up (3 months ).  HPI  Here for follow up   Has been having cough x 1 month. No nausea/vomiting/diarrhea. Has wheezing. Using his albuterol. No fever, no ear pain, no sore throat.   Borderline diabetic, today's hba1c 5.5, improved from before.   Having pain in his shoulder. Had seen orthopedic and gotten steroid injection did not help. Will follow up with orthopedic. Would like Toradol in the meantime.    Review of Systems  General: no fever  Eyes: no blurry vision  ENT: no sore throat, no ear pain  Resp: see HPI  Cardio: no chest pain, no palpitations  Abd: no nausea/vomiting  : no dysuria, no increased urinary frequency      /72   Pulse 83   Temp 36.7 °C (98.1 °F)   Ht 1.829 m (6')   Wt 90.7 kg (200 lb)   SpO2 95%   BMI 27.12 kg/m²       Objective   Physical Exam  Gen: NAD, alert  Head: normocephalic/atraumatic  Eyes: conjunctivae normal  Ears: canals clear bilaterally, TM normal   Nose: external nose normal   Oropharynx: clear   Resp: Clear to auscultation  CVS: Regular rate and rhythm  Abdomen: soft, NT, ND  Ext: no edema, NT of lower extremities    Personally reviewed the following labs:   Lab Results   Component Value Date    WBC 12.4 (H) 02/20/2025    HGB 14.2 02/20/2025    HCT 45.1 02/20/2025    MCV 86.4 02/20/2025     02/20/2025       Chemistry    Lab Results   Component Value Date/Time     10/16/2024 1927    K 3.8 10/16/2024 1927     10/16/2024 1927    CO2 24 10/16/2024 1927    BUN 12 10/16/2024 1927    CREATININE 1.26 10/16/2024 1927    Lab Results   Component Value Date/Time    CALCIUM 8.6 10/16/2024 1927    ALKPHOS 101 10/16/2024 1927    AST 16 10/16/2024 1927    ALT 10 10/16/2024 1927    BILITOT 0.4 10/16/2024 1927        Xray shoulders - Degenerative changes of both shoulders as described above. No sign of acute fracture or dislocation.    Osteopenia.    Assessment/Plan   Problem List  Items Addressed This Visit       Borderline hyperglycemia - Primary    Relevant Orders    POCT glycosylated hemoglobin (Hb A1C) manually resulted (Completed)     Other Visit Diagnoses         Acute pain of right shoulder        Relevant Medications    ketorolac (Toradol) injection 30 mg (Completed)      COPD exacerbation (Multi)        Relevant Medications    predniSONE (Deltasone) 20 mg tablet    doxycycline (Monodox) 100 mg capsule

## 2025-05-13 ENCOUNTER — TELEPHONE (OUTPATIENT)
Dept: PULMONOLOGY | Facility: CLINIC | Age: 70
End: 2025-05-13

## 2025-05-13 ENCOUNTER — APPOINTMENT (OUTPATIENT)
Dept: PRIMARY CARE | Facility: CLINIC | Age: 70
End: 2025-05-13
Payer: MEDICARE

## 2025-05-13 VITALS
BODY MASS INDEX: 26.76 KG/M2 | HEIGHT: 72 IN | OXYGEN SATURATION: 95 % | TEMPERATURE: 97.6 F | WEIGHT: 197.6 LBS | HEART RATE: 70 BPM | DIASTOLIC BLOOD PRESSURE: 70 MMHG | SYSTOLIC BLOOD PRESSURE: 138 MMHG

## 2025-05-13 DIAGNOSIS — R07.81 RIB PAIN ON RIGHT SIDE: Primary | ICD-10-CM

## 2025-05-13 PROCEDURE — 3078F DIAST BP <80 MM HG: CPT | Performed by: FAMILY MEDICINE

## 2025-05-13 PROCEDURE — 3008F BODY MASS INDEX DOCD: CPT | Performed by: FAMILY MEDICINE

## 2025-05-13 PROCEDURE — 99213 OFFICE O/P EST LOW 20 MIN: CPT | Performed by: FAMILY MEDICINE

## 2025-05-13 PROCEDURE — 3075F SYST BP GE 130 - 139MM HG: CPT | Performed by: FAMILY MEDICINE

## 2025-05-13 PROCEDURE — 1159F MED LIST DOCD IN RCRD: CPT | Performed by: FAMILY MEDICINE

## 2025-05-13 RX ORDER — LIDOCAINE 50 MG/G
1 PATCH TOPICAL DAILY
Qty: 10 PATCH | Refills: 0 | Status: SHIPPED | OUTPATIENT
Start: 2025-05-13

## 2025-05-13 RX ORDER — CYCLOBENZAPRINE HCL 5 MG
5 TABLET ORAL NIGHTLY PRN
Qty: 30 TABLET | Refills: 0 | Status: SHIPPED | OUTPATIENT
Start: 2025-05-13 | End: 2025-07-12

## 2025-05-13 NOTE — PROGRESS NOTES
"Subjective   Patient ID: Juanjo Alexis is a 70 y.o. male who presents for Rib Injury (X 1 months - reached over to unlock passenger door, hit console, rib felt \" a bit\", radiates around to back, right side ).    HPI  Here for urgent visit   For the past month has been having right lower rib pain, worse with certain movements, started after he was reaching over to unlock the passenger door and hit the console. Felt a pop. Was on prednisone and received toradol did not help with pain.     Review of Systems  General: no fever  Eyes: no blurry vision  ENT: no sore throat, no ear pain  Resp: no cough, sob or wheezing  Cardio: no chest pain, no palpitations  Abd: no nausea/vomiting  : no dysuria, no increased urinary frequency      /70   Pulse 70   Temp 36.4 °C (97.6 °F)   Ht 1.829 m (6')   Wt 89.6 kg (197 lb 9.6 oz)   SpO2 95%   BMI 26.80 kg/m²       Objective   Physical Exam  Gen: NAD, alert  Head: normocephalic/atraumatic  Eyes: conjunctivae normal  Ears: canals clear bilaterally, TM normal   Nose: external nose normal   Oropharynx: clear   Resp: Clear to auscultation  Tenderness in right lower rib  CVS: Regular rate and rhythm  Abdomen: soft, NT, ND  Ext: no edema, NT of lower extremities      Assessment/Plan   Problem List Items Addressed This Visit    None  Visit Diagnoses         Rib pain on right side    -  Primary    Relevant Medications    lidocaine (Lidoderm) 5 % patch    cyclobenzaprine (Flexeril) 5 mg tablet    Other Relevant Orders    XR ribs right 2 views               "

## 2025-05-13 NOTE — TELEPHONE ENCOUNTER
Pts wife came in to office and said that the trelegy 200 for her  is just too expensive for him to use I did give the pt 2 samples of the trelegy 200 and I told her that I would have the provider send in a clinical pharmacy referral over to [facility] to see if the pt can get this med at a reduce cost or even free. Can you submit a referral for the pt for the trelegy?    Thank you!

## 2025-05-15 ENCOUNTER — HOSPITAL ENCOUNTER (OUTPATIENT)
Dept: RADIOLOGY | Facility: HOSPITAL | Age: 70
Discharge: HOME | End: 2025-05-15
Payer: MEDICARE

## 2025-05-15 DIAGNOSIS — R07.81 RIB PAIN ON RIGHT SIDE: ICD-10-CM

## 2025-05-15 PROCEDURE — 71101 X-RAY EXAM UNILAT RIBS/CHEST: CPT | Mod: RT

## 2025-05-19 DIAGNOSIS — J44.9 CHRONIC OBSTRUCTIVE PULMONARY DISEASE, UNSPECIFIED COPD TYPE (MULTI): Primary | ICD-10-CM

## 2025-06-10 DIAGNOSIS — R07.81 RIB PAIN ON RIGHT SIDE: ICD-10-CM

## 2025-06-10 RX ORDER — CYCLOBENZAPRINE HCL 5 MG
5 TABLET ORAL NIGHTLY PRN
Qty: 90 TABLET | Refills: 3 | Status: SHIPPED | OUTPATIENT
Start: 2025-06-10

## 2025-06-17 ENCOUNTER — APPOINTMENT (OUTPATIENT)
Dept: PHARMACY | Facility: HOSPITAL | Age: 70
End: 2025-06-17
Payer: MEDICARE

## 2025-06-17 DIAGNOSIS — J44.9 CHRONIC OBSTRUCTIVE PULMONARY DISEASE, UNSPECIFIED COPD TYPE (MULTI): ICD-10-CM

## 2025-06-17 DIAGNOSIS — J44.1 COPD EXACERBATION (MULTI): ICD-10-CM

## 2025-06-17 RX ORDER — FLUTICASONE FUROATE, UMECLIDINIUM BROMIDE AND VILANTEROL TRIFENATATE 200; 62.5; 25 UG/1; UG/1; UG/1
1 POWDER RESPIRATORY (INHALATION) DAILY
Qty: 180 EACH | Refills: 3 | Status: SHIPPED | OUTPATIENT
Start: 2025-06-17

## 2025-06-17 NOTE — PROGRESS NOTES
"  Pharmacist Clinic: Pulmonary Management    Juanjo Alexis is a 70 y.o. male was referred to Clinical Pharmacy Team for Pulmonary assessment.   Referring Provider: Luis M Damon, *    Subjective   Allergies[1]    HPI    PULMONARY ASSESSMENT  Patient has been diagnosed with: COPD    Current Regimen:  Duonebs  Albuterol HFA    Symptom Management:  Current symptoms: dyspnea  Alleviating factors: rescue therapy    Rescue Inhaler Use:  How often do you use your rescue inhaler? 2-3x weekly, nebs 4-5x weekly    Appropriate Inhaler Technique: yes      Objective   There were no vitals taken for this visit.    Secondary Prevention (vaccines):  -Influenza: Date [2023]  -PCV13: Date [2022]  -PPSV23: Date [2019]  -PCV20: Date [Consider in 2 years]  -COVID: Date [2021]  -RSV: Date [Recommended]  -TDAP: Date [2018]  -Shingrix: Date [Recommended]    Pulmonary Functions Testing Results:  No results found for: \"FEV1\", \"FVC\", \"XNV3BTF\", \"TLC\", \"DLCO\"    Lab Review  Lab Results   Component Value Date    BILITOT 0.4 10/16/2024    CALCIUM 8.6 10/16/2024    CO2 24 10/16/2024     10/16/2024    CREATININE 1.26 10/16/2024    GLUCOSE 149 (H) 10/16/2024    ALKPHOS 101 10/16/2024    K 3.8 10/16/2024    PROT 7.0 10/16/2024     10/16/2024    AST 16 10/16/2024    ALT 10 10/16/2024    BUN 12 10/16/2024    ANIONGAP 13 10/16/2024    MG 2.05 02/10/2024    PHOS 3.3 03/03/2019    ALBUMIN 3.8 10/16/2024    AMYLASE 89 02/23/2019    LIPASE 22 02/10/2024    GFRMALE 69 10/29/2022     HEMOGLOBIN   Date Value Ref Range Status   02/20/2025 14.2 13.2 - 17.1 g/dL Final     WHITE BLOOD CELL COUNT   Date Value Ref Range Status   02/20/2025 12.4 (H) 3.8 - 10.8 Thousand/uL Final     Platelets   Date Value Ref Range Status   10/16/2024 325 150 - 450 x10*3/uL Final       The ASCVD Risk score (Maximiliano DK, et al., 2019) failed to calculate for the following reasons:    The valid total cholesterol range is 130 to 320 mg/dL    Current Outpatient " "Medications   Medication Instructions    albuterol 90 mcg/actuation inhaler 1 puff, inhalation, Every 6 hours PRN    albuterol 2.5 mg, nebulization, Every 6 hours PRN    amLODIPine (Norvasc) 10 mg tablet TAKE 1 TABLET BY MOUTH EVERY DAY    BD Integra Syringe 3 mL 25 gauge x 5/8\" syringe INJECT VITAMIN B12 ONCE MONTHLY    cyanocobalamin (VITAMIN B-12) 1,000 mcg, intramuscular, Every 30 days    cyclobenzaprine (FLEXERIL) 5 mg, oral, Nightly PRN    DULoxetine (Cymbalta) 30 mg DR capsule TAKE 1 CAPSULE BY MOUTH ONCE DAILY, TAKE WITH THE 60MG TO EQUAL 90MG    DULoxetine (Cymbalta) 60 mg DR capsule TAKE 1 CAPSULE DAILY ALONG WITH THE 30MG, TO EQUAL 90MG    fluticasone-umeclidin-vilanter (Trelegy Ellipta) 200-62.5-25 mcg blister with device 1 puff, inhalation, Daily    ketoconazole (NIZOral) 2 % cream To affected areas twice daily as needed    lidocaine (Lidoderm) 5 % patch 1 patch, transdermal, Daily, Remove & discard patch within 12 hours or as directed by MD.    losartan (COZAAR) 25 mg, oral, Daily    meloxicam (MOBIC) 15 mg, oral, Daily PRN, Take with food    metoprolol tartrate (Lopressor) 50 mg tablet TAKE 1 TABLET BY MOUTH TWICE A DAY    nystatin (Mycostatin) 100,000 unit/gram powder 1 Application, Topical, 2 times daily    pregabalin (LYRICA) 200 mg, oral, 2 times daily    QUEtiapine (SEROquel) 25 mg tablet TAKE 1 TABLET BY MOUTH TWICE A DAY    simvastatin (Zocor) 20 mg tablet TAKE 1 TABLET BY MOUTH EVERY DAY IN THE EVENING     Affordability/Accessibility:  Struggles with adherence due to high copay and fixed income    Assessment/Plan   Problem List Items Addressed This Visit       Chronic obstructive pulmonary disease (Multi)     Other Visit Diagnoses         COPD exacerbation (Multi)        Relevant Medications    fluticasone-umeclidin-vilanter (Trelegy Ellipta) 200-62.5-25 mcg blister with device        Continue current therapy plan to restart Trelegy 200-62.5-25 daily  Counseled on admin and side effect " mgmt  Follow up PRN for continued enrollment  Patient to drop off forms at  [facility]     Patient Assistance Screening (VAF)    Patient verbally reports monthly or yearly income which is less than 400% federal poverty level     Application for program has been submitted for the following medications: Trelegy    Patient has been informed that program team will be reaching out to them to discuss necessary documentation, instructed to answer phone/return voicemail.     Patient aware this process may take up to 6 weeks.     If approved medication must be filled through [facility] pharmacy and may be picked up or mailed to patient.       Norman William RPh    Continue all meds under the continuation of care with the referring provider and clinical pharmacy team.    Verbal consent to manage patient's drug therapy was obtained from the patient. They were informed they may decline to participate or withdraw from participation in pharmacy services at any time.         [1]   Allergies  Allergen Reactions    Amoxicillin-Pot Clavulanate Unknown     GI upset    Azithromycin GI Upset    Codeine Unknown    Hydrochlorothiazide Unknown    Ondansetron Hcl Unknown

## 2025-06-20 ENCOUNTER — HOSPITAL ENCOUNTER (EMERGENCY)
Facility: HOSPITAL | Age: 70
Discharge: HOME | End: 2025-06-20
Attending: EMERGENCY MEDICINE
Payer: MEDICARE

## 2025-06-20 ENCOUNTER — APPOINTMENT (OUTPATIENT)
Dept: RADIOLOGY | Facility: HOSPITAL | Age: 70
End: 2025-06-20
Payer: MEDICARE

## 2025-06-20 VITALS
BODY MASS INDEX: 27.58 KG/M2 | OXYGEN SATURATION: 97 % | HEART RATE: 57 BPM | SYSTOLIC BLOOD PRESSURE: 157 MMHG | HEIGHT: 71 IN | DIASTOLIC BLOOD PRESSURE: 65 MMHG | WEIGHT: 197 LBS | TEMPERATURE: 97.3 F | RESPIRATION RATE: 16 BRPM

## 2025-06-20 DIAGNOSIS — M25.511 CHRONIC RIGHT SHOULDER PAIN: ICD-10-CM

## 2025-06-20 DIAGNOSIS — G89.29 CHRONIC BILATERAL LOW BACK PAIN WITHOUT SCIATICA: Primary | ICD-10-CM

## 2025-06-20 DIAGNOSIS — M54.50 CHRONIC BILATERAL LOW BACK PAIN WITHOUT SCIATICA: Primary | ICD-10-CM

## 2025-06-20 DIAGNOSIS — G89.29 CHRONIC RIGHT SHOULDER PAIN: ICD-10-CM

## 2025-06-20 PROCEDURE — 2500000001 HC RX 250 WO HCPCS SELF ADMINISTERED DRUGS (ALT 637 FOR MEDICARE OP): Performed by: EMERGENCY MEDICINE

## 2025-06-20 PROCEDURE — 73030 X-RAY EXAM OF SHOULDER: CPT | Mod: RIGHT SIDE

## 2025-06-20 PROCEDURE — 96372 THER/PROPH/DIAG INJ SC/IM: CPT | Performed by: EMERGENCY MEDICINE

## 2025-06-20 PROCEDURE — 99284 EMERGENCY DEPT VISIT MOD MDM: CPT | Performed by: EMERGENCY MEDICINE

## 2025-06-20 PROCEDURE — 73030 X-RAY EXAM OF SHOULDER: CPT | Mod: RT

## 2025-06-20 PROCEDURE — 2500000004 HC RX 250 GENERAL PHARMACY W/ HCPCS (ALT 636 FOR OP/ED): Mod: JZ,TB | Performed by: EMERGENCY MEDICINE

## 2025-06-20 PROCEDURE — 2500000005 HC RX 250 GENERAL PHARMACY W/O HCPCS: Performed by: EMERGENCY MEDICINE

## 2025-06-20 RX ORDER — LIDOCAINE 560 MG/1
1 PATCH PERCUTANEOUS; TOPICAL; TRANSDERMAL ONCE
Status: DISCONTINUED | OUTPATIENT
Start: 2025-06-20 | End: 2025-06-20 | Stop reason: HOSPADM

## 2025-06-20 RX ORDER — CYCLOBENZAPRINE HCL 5 MG
5 TABLET ORAL ONCE
Status: COMPLETED | OUTPATIENT
Start: 2025-06-20 | End: 2025-06-20

## 2025-06-20 RX ORDER — CYCLOBENZAPRINE HCL 5 MG
5 TABLET ORAL 3 TIMES DAILY PRN
Qty: 20 TABLET | Refills: 0 | Status: SHIPPED | OUTPATIENT
Start: 2025-06-20 | End: 2025-06-30

## 2025-06-20 RX ORDER — KETOROLAC TROMETHAMINE 15 MG/ML
15 INJECTION, SOLUTION INTRAMUSCULAR; INTRAVENOUS ONCE
Status: COMPLETED | OUTPATIENT
Start: 2025-06-20 | End: 2025-06-20

## 2025-06-20 RX ADMIN — LIDOCAINE 1 PATCH: 4 PATCH TOPICAL at 14:46

## 2025-06-20 RX ADMIN — KETOROLAC TROMETHAMINE 15 MG: 15 INJECTION, SOLUTION INTRAMUSCULAR; INTRAVENOUS at 14:44

## 2025-06-20 RX ADMIN — CYCLOBENZAPRINE HYDROCHLORIDE 5 MG: 5 TABLET, FILM COATED ORAL at 14:46

## 2025-06-20 ASSESSMENT — PAIN DESCRIPTION - PAIN TYPE: TYPE: ACUTE PAIN

## 2025-06-20 ASSESSMENT — PAIN DESCRIPTION - ONSET: ONSET: SUDDEN

## 2025-06-20 ASSESSMENT — PAIN SCALES - GENERAL
PAINLEVEL_OUTOF10: 4
PAINLEVEL_OUTOF10: 8
PAINLEVEL_OUTOF10: 3

## 2025-06-20 ASSESSMENT — PAIN - FUNCTIONAL ASSESSMENT
PAIN_FUNCTIONAL_ASSESSMENT: 0-10
PAIN_FUNCTIONAL_ASSESSMENT: 0-10

## 2025-06-20 ASSESSMENT — PAIN DESCRIPTION - DESCRIPTORS: DESCRIPTORS: ACHING

## 2025-06-20 ASSESSMENT — PAIN DESCRIPTION - FREQUENCY: FREQUENCY: CONSTANT/CONTINUOUS

## 2025-06-20 ASSESSMENT — PAIN DESCRIPTION - LOCATION: LOCATION: BACK

## 2025-06-20 ASSESSMENT — PAIN DESCRIPTION - ORIENTATION: ORIENTATION: LOWER

## 2025-06-22 NOTE — ED PROVIDER NOTES
HPI   Chief Complaint   Patient presents with    Back Pain     Low x 7 + days and also with right shoulder pain        HPI  Patient is a 70-year-old male presenting to the ED today for low back pain and right shoulder pain.  Patient explains that about 1 week ago, he was installing a heavy device.  He lifted it with his right arm and feels like he pulled his back.  He does have chronic low back pain at baseline, as well as chronic right shoulder pain at baseline.  He has had injections before in his bilateral shoulders for chronic pain.  He notes that after installing this device, with excessive use of his right arm, he has had exacerbation of both his right shoulder pain as well as low back pain.  He describes bilateral low back pain, nonradiating in nature, worse with certain movements and ambulation.  He denies any pain currently at rest.  He similarly describes right shoulder pain that is exacerbated by forward flexion of the shoulder.  He denies any associated numbness or weakness. Patient denies recent fever, history of IV drug abuse, history of malignancy, direct trauma to the back, saddle paresthesia, bowel or bladder incontinence, previous infection of the brain, spinal cord or vertebral column, use of anticoagulation.      Patient History   Medical History[1]  Surgical History[2]  Family History[3]  Social History[4]    Physical Exam   ED Triage Vitals   Temperature Heart Rate Respirations BP   06/20/25 1402 06/20/25 1402 06/20/25 1402 06/20/25 1402   36.3 °C (97.3 °F) 61 19 162/73      Pulse Ox Temp Source Heart Rate Source Patient Position   06/20/25 1402 06/20/25 1402 06/20/25 1600 06/20/25 1600   97 % Tympanic Monitor Lying      BP Location FiO2 (%)     06/20/25 1600 --     Right arm        Physical Exam  Vitals and nursing note reviewed.   Constitutional:       General: He is not in acute distress.     Appearance: He is not toxic-appearing.   HENT:      Head: Normocephalic.      Mouth/Throat:       Mouth: Mucous membranes are moist.   Eyes:      Extraocular Movements: Extraocular movements intact.      Conjunctiva/sclera: Conjunctivae normal.   Cardiovascular:      Rate and Rhythm: Normal rate and regular rhythm.      Pulses: Normal pulses.   Pulmonary:      Effort: Pulmonary effort is normal. No respiratory distress.      Breath sounds: Normal breath sounds. No wheezing.   Abdominal:      General: There is no distension.      Palpations: Abdomen is soft.      Tenderness: There is no abdominal tenderness.   Musculoskeletal:         General: No swelling.      Cervical back: Neck supple.      Comments: Tenderness to palpation of the right anterior shoulder.  Patient has pain with forward flexion and abduction of the right shoulder to about 90 degrees.  5 out of 5 strength to the right shoulder, elbow, and wrist.  Normal sensation to light touch distally.  2+ radial pulse present.   Skin:     General: Skin is warm and dry.      Capillary Refill: Capillary refill takes less than 2 seconds.   Neurological:      General: No focal deficit present.      Mental Status: He is alert. Mental status is at baseline.      Comments: No saddle paresthesia.  Lower extremities demonstrate symmetrical 5/5 strength for flexion and extension at the hip, abduction at the hip, flexion and extension at the knee, dorsiflexion and plantarflexion at the ankle.  Full ROM with hip, knee and ankle.   Bones nontender to palpation.  Normal sensation to light touch over all dermatomes of the thigh, calf and foot bilaterally.  No contusions, abrasions, lacerations, pallor or cyanosis noted on the skin.  +2 DP and PT pulses bilaterally.   Cap refill <2 seconds bilaterally.           ED Course & MDM   Diagnoses as of 06/22/25 0756   Chronic bilateral low back pain without sciatica   Chronic right shoulder pain             No data recorded     Lizbeth Coma Scale Score: 15 (06/20/25 1450 : Shahida Gamez RN)                         Medical Decision  Making  Patient was seen and evaluated for back and shoulder pain.  Differential diagnosis includes but is not limited to musculoskeletal pain, kidney stone, pyelonephritis, cauda equina, spinal epidural abscess, vertebral fracture.  However, as patient's history and exam are consistent with musculoskeletal pain and associated sciatica, and patient does not have any red flag signs/symptoms for back pain, additional labs and imaging of the back are not indicated at this time.  X-ray of the right shoulder was ordered for further evaluation.  Patient is treated symptomatically with Toradol 15 mg IM, 4% lidocaine transdermal patch, and flexeril 5 mg PO.    XR shoulder right 2+ views   Final Result   No acute osseous abnormality        Unchanged hypertrophy of the AC joint             MACRO:   None        Signed by: Nidhi Daley 6/20/2025 3:28 PM   Dictation workstation:   [id number]        On reevaluation, patient is resting comfortably in bed.  He states that his pain has improved after administration of the medications.  Patient is informed of his imaging results and all questions and concerns were answered. Discharge planning with close outpatient follow-up was discussed at this time, to which the patient was agreeable. Strict return precautions were given, and patient was discharged home in stable condition with a prescription for flexeril as needed for pain.    Tests/Medications/Escalations of Care considered but not given:  Considered additional tests such as CBC, CMP, ESR, CRP, CT/MRI imaging of the back.  However, as patient has no red flag signs/symptoms of back pain, and history/exam are consistent with musculoskeletal pain, additional tests are not indicated at this time.      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    Neuralgia and neuritis, unspecified     Neuritis    Nontoxic single thyroid nodule     Solitary thyroid nodule    Other specified anxiety disorders     Depression with anxiety    Personal history of benign carcinoid tumor 12/16/2015    History of benign carcinoid tumor   [2]   Past Surgical History:  Procedure Laterality Date    COLONOSCOPY  05/20/2013    Complete Colonoscopy    OTHER SURGICAL HISTORY  03/18/2013    Excision Of Burn Tissue Shoulder    OTHER SURGICAL HISTORY  03/04/2021    Pancreatic surgery    SHOULDER SURGERY  04/01/2013    Shoulder Surgery    SMALL INTESTINE SURGERY  06/30/2017    Small Bowel Resection   [3]   Family History  Problem Relation Name Age of Onset    Lung cancer Mother      Heart failure Father     [4]   Social History  Tobacco Use    Smoking status: Every Day     Current packs/day: 1.00     Average packs/day: 1 pack/day for 40.0 years (40.0 ttl pk-yrs)     Types: Cigarettes    Smokeless tobacco: Never   Substance Use Topics    Alcohol use: Not Currently    Drug use: Never        Tamiko BUTT MD  06/22/25 0818

## 2025-07-01 DIAGNOSIS — M54.12 CERVICAL RADICULOPATHY: ICD-10-CM

## 2025-07-01 DIAGNOSIS — B35.6 TINEA CRURIS: ICD-10-CM

## 2025-07-01 DIAGNOSIS — R21 RASH AND OTHER NONSPECIFIC SKIN ERUPTION: ICD-10-CM

## 2025-07-01 DIAGNOSIS — F32.1 MODERATE MAJOR DEPRESSION (MULTI): ICD-10-CM

## 2025-07-01 RX ORDER — PREGABALIN 200 MG/1
200 CAPSULE ORAL 2 TIMES DAILY
Qty: 60 CAPSULE | Refills: 3 | Status: SHIPPED | OUTPATIENT
Start: 2025-07-01

## 2025-07-01 RX ORDER — CLOTRIMAZOLE AND BETAMETHASONE DIPROPIONATE 10; .64 MG/G; MG/G
CREAM TOPICAL
Qty: 45 G | Refills: 1 | OUTPATIENT
Start: 2025-07-01

## 2025-07-01 RX ORDER — DULOXETIN HYDROCHLORIDE 60 MG/1
CAPSULE, DELAYED RELEASE ORAL
Qty: 90 CAPSULE | Refills: 1 | Status: SHIPPED | OUTPATIENT
Start: 2025-07-01

## 2025-07-01 RX ORDER — FLUCONAZOLE 100 MG/1
TABLET ORAL
Qty: 4 TABLET | Refills: 0 | OUTPATIENT
Start: 2025-07-01

## 2025-07-01 RX ORDER — NYSTATIN TOPICAL POWDER 100000 U/G
POWDER TOPICAL 2 TIMES DAILY
Qty: 60 G | Refills: 0 | OUTPATIENT
Start: 2025-07-01

## 2025-08-06 DIAGNOSIS — J44.1 COPD EXACERBATION (MULTI): ICD-10-CM

## 2025-08-06 RX ORDER — ALBUTEROL SULFATE 0.83 MG/ML
2.5 SOLUTION RESPIRATORY (INHALATION) EVERY 6 HOURS PRN
Qty: 60 ML | Refills: 1 | Status: SHIPPED | OUTPATIENT
Start: 2025-08-06

## 2025-08-18 ENCOUNTER — APPOINTMENT (OUTPATIENT)
Dept: ORTHOPEDIC SURGERY | Facility: CLINIC | Age: 70
End: 2025-08-18
Payer: MEDICARE

## 2025-08-18 DIAGNOSIS — M75.101 TEAR OF RIGHT ROTATOR CUFF, UNSPECIFIED TEAR EXTENT, UNSPECIFIED WHETHER TRAUMATIC: Primary | ICD-10-CM

## 2025-08-18 DIAGNOSIS — M75.41 SHOULDER IMPINGEMENT SYNDROME, RIGHT: ICD-10-CM

## 2025-08-18 PROCEDURE — 99213 OFFICE O/P EST LOW 20 MIN: CPT | Performed by: ORTHOPAEDIC SURGERY

## 2025-08-18 PROCEDURE — 1159F MED LIST DOCD IN RCRD: CPT | Performed by: ORTHOPAEDIC SURGERY

## 2025-08-18 PROCEDURE — 1160F RVW MEDS BY RX/DR IN RCRD: CPT | Performed by: ORTHOPAEDIC SURGERY

## 2025-08-18 PROCEDURE — 20611 DRAIN/INJ JOINT/BURSA W/US: CPT | Performed by: ORTHOPAEDIC SURGERY

## 2025-08-18 RX ADMIN — LIDOCAINE HYDROCHLORIDE 3 ML: 10 INJECTION, SOLUTION INFILTRATION; PERINEURAL at 15:30

## 2025-08-18 RX ADMIN — TRIAMCINOLONE ACETONIDE 40 MG: 40 INJECTION, SUSPENSION INTRA-ARTICULAR; INTRAMUSCULAR at 15:30

## 2025-08-18 ASSESSMENT — PAIN - FUNCTIONAL ASSESSMENT: PAIN_FUNCTIONAL_ASSESSMENT: 0-10

## 2025-08-18 ASSESSMENT — PAIN SCALES - GENERAL: PAINLEVEL_OUTOF10: 10 - WORST POSSIBLE PAIN

## 2025-08-19 RX ORDER — LIDOCAINE HYDROCHLORIDE 10 MG/ML
3 INJECTION, SOLUTION INFILTRATION; PERINEURAL
Status: COMPLETED | OUTPATIENT
Start: 2025-08-18 | End: 2025-08-18

## 2025-08-19 RX ORDER — TRIAMCINOLONE ACETONIDE 40 MG/ML
40 INJECTION, SUSPENSION INTRA-ARTICULAR; INTRAMUSCULAR
Status: COMPLETED | OUTPATIENT
Start: 2025-08-18 | End: 2025-08-18

## 2025-08-19 ASSESSMENT — ENCOUNTER SYMPTOMS
CHILLS: 0
WHEEZING: 0
FEVER: 0
SHORTNESS OF BREATH: 0
SINUS PRESSURE: 0
FATIGUE: 0

## 2025-08-20 ENCOUNTER — APPOINTMENT (OUTPATIENT)
Dept: PRIMARY CARE | Facility: CLINIC | Age: 70
End: 2025-08-20
Payer: MEDICARE

## 2025-08-20 ENCOUNTER — HOSPITAL ENCOUNTER (OUTPATIENT)
Dept: RADIOLOGY | Facility: HOSPITAL | Age: 70
Discharge: HOME | End: 2025-08-20
Payer: MEDICARE

## 2025-08-20 DIAGNOSIS — J44.1 COPD EXACERBATION (MULTI): ICD-10-CM

## 2025-08-20 PROCEDURE — 71046 X-RAY EXAM CHEST 2 VIEWS: CPT | Performed by: RADIOLOGY

## 2025-08-20 PROCEDURE — 71046 X-RAY EXAM CHEST 2 VIEWS: CPT

## 2025-08-20 ASSESSMENT — PATIENT HEALTH QUESTIONNAIRE - PHQ9
SUM OF ALL RESPONSES TO PHQ9 QUESTIONS 1 AND 2: 4
1. LITTLE INTEREST OR PLEASURE IN DOING THINGS: MORE THAN HALF THE DAYS
2. FEELING DOWN, DEPRESSED OR HOPELESS: MORE THAN HALF THE DAYS

## 2025-08-27 ENCOUNTER — APPOINTMENT (OUTPATIENT)
Dept: PRIMARY CARE | Facility: CLINIC | Age: 70
End: 2025-08-27
Payer: MEDICARE

## 2025-08-27 VITALS — OXYGEN SATURATION: 98 % | SYSTOLIC BLOOD PRESSURE: 174 MMHG | DIASTOLIC BLOOD PRESSURE: 77 MMHG | HEART RATE: 57 BPM

## 2025-09-03 ENCOUNTER — HOSPITAL ENCOUNTER (OUTPATIENT)
Dept: RADIOLOGY | Facility: HOSPITAL | Age: 70
Discharge: HOME | End: 2025-09-03
Payer: MEDICARE

## 2025-09-03 DIAGNOSIS — R14.0 ABDOMINAL DISTENSION: ICD-10-CM

## 2025-09-03 DIAGNOSIS — R10.84 GENERALIZED ABDOMINAL PAIN: ICD-10-CM

## 2025-09-03 PROCEDURE — 74176 CT ABD & PELVIS W/O CONTRAST: CPT

## 2025-09-03 PROCEDURE — 74176 CT ABD & PELVIS W/O CONTRAST: CPT | Performed by: RADIOLOGY

## 2025-09-24 ENCOUNTER — APPOINTMENT (OUTPATIENT)
Dept: PRIMARY CARE | Facility: CLINIC | Age: 70
End: 2025-09-24
Payer: MEDICARE

## 2025-10-31 ENCOUNTER — APPOINTMENT (OUTPATIENT)
Dept: DERMATOLOGY | Facility: CLINIC | Age: 70
End: 2025-10-31
Payer: MEDICARE

## 2025-11-07 ENCOUNTER — APPOINTMENT (OUTPATIENT)
Dept: PULMONOLOGY | Facility: CLINIC | Age: 70
End: 2025-11-07
Payer: MEDICARE

## 2025-11-10 ENCOUNTER — APPOINTMENT (OUTPATIENT)
Dept: PULMONOLOGY | Facility: CLINIC | Age: 70
End: 2025-11-10
Payer: MEDICARE

## 2025-12-04 ENCOUNTER — APPOINTMENT (OUTPATIENT)
Dept: PRIMARY CARE | Facility: CLINIC | Age: 70
End: 2025-12-04
Payer: MEDICARE